# Patient Record
Sex: FEMALE | Race: ASIAN | NOT HISPANIC OR LATINO | Employment: UNEMPLOYED | URBAN - METROPOLITAN AREA
[De-identification: names, ages, dates, MRNs, and addresses within clinical notes are randomized per-mention and may not be internally consistent; named-entity substitution may affect disease eponyms.]

---

## 2018-06-05 ENCOUNTER — OFFICE VISIT (OUTPATIENT)
Dept: GYNECOLOGY | Facility: CLINIC | Age: 53
End: 2018-06-05
Payer: COMMERCIAL

## 2018-06-05 VITALS
HEIGHT: 61 IN | SYSTOLIC BLOOD PRESSURE: 100 MMHG | DIASTOLIC BLOOD PRESSURE: 60 MMHG | BODY MASS INDEX: 30.47 KG/M2 | WEIGHT: 161.4 LBS

## 2018-06-05 DIAGNOSIS — Z12.31 ENCOUNTER FOR SCREENING MAMMOGRAM FOR MALIGNANT NEOPLASM OF BREAST: Primary | ICD-10-CM

## 2018-06-05 DIAGNOSIS — D25.2 INTRAMURAL AND SUBSEROUS LEIOMYOMA OF UTERUS: ICD-10-CM

## 2018-06-05 DIAGNOSIS — Z01.419 ENCOUNTER FOR ROUTINE GYNECOLOGICAL EXAMINATION WITH PAPANICOLAOU SMEAR OF CERVIX: ICD-10-CM

## 2018-06-05 DIAGNOSIS — K59.00 CONSTIPATION, UNSPECIFIED CONSTIPATION TYPE: ICD-10-CM

## 2018-06-05 DIAGNOSIS — D25.1 INTRAMURAL AND SUBSEROUS LEIOMYOMA OF UTERUS: ICD-10-CM

## 2018-06-05 DIAGNOSIS — R10.2 PELVIC PRESSURE IN FEMALE: ICD-10-CM

## 2018-06-05 DIAGNOSIS — R14.0 BLOATING: ICD-10-CM

## 2018-06-05 PROCEDURE — 99212 OFFICE O/P EST SF 10 MIN: CPT | Performed by: OBSTETRICS & GYNECOLOGY

## 2018-06-05 PROCEDURE — G0145 SCR C/V CYTO,THINLAYER,RESCR: HCPCS | Performed by: OBSTETRICS & GYNECOLOGY

## 2018-06-05 RX ORDER — QUINIDINE GLUCONATE 324 MG
TABLET, EXTENDED RELEASE ORAL 3 TIMES WEEKLY
COMMUNITY

## 2018-06-05 RX ORDER — UBIDECARENONE 75 MG
CAPSULE ORAL DAILY
COMMUNITY

## 2018-06-05 NOTE — PROGRESS NOTES
Assessment/Plan:  Discussed the fibroids  Discussed options  Discussed that the this may be contributing to some of the abdominal bloating pelvic pressure and pain  We discussed options including following, uterine artery embolization, although this may be an issue with her history of allergy to MRI dye  Discussed surgical management including hysterectomy  Patient will return to the office for transvaginal scan to re-evaluate the fibroids and to let us know what decisions she has made  I have also recommended consultation with GI for a baseline colonoscopy but also because of her bloating and constipation issues  Diagnoses and all orders for this visit:    Encounter for screening mammogram for malignant neoplasm of breast  -     Mammo screening bilateral w 3d & cad; Future    Encounter for routine gynecological examination with Papanicolaou smear of cervix  -     Liquid-based pap, screening    Intramural and subserous leiomyoma of uterus    Pelvic pressure in female    Bloating    Constipation, unspecified constipation type    Other orders  -     ferrous gluconate ([facility] IRON) 240 (27 FE) MG tablet; Take by mouth  -     Cholecalciferol ([facility] VITAMIN D) 2000 units CAPS; Take by mouth  -     cyanocobalamin (VITAMIN B-12) 100 mcg tablet; Take by mouth        Subjective:      Patient ID: Lam Akbar is a 50 y o  female  Patient is G0 para 0010 who presents for a annual gyn exam   Since October she has been experiencing more persistent abdominal discomfort, suprapubic pressure, constipation, bloating  She has no associated nauseousness, vomiting, diarrhea,, or fever  No dysuria hematuria, urgency, or change in frequency of urination  She has a known fibroid uterus  In January of 2015 she had an MRI review revealed an intramural fibroid posteriorly measuring 6 7 x 6 3 x 4 5 cm  She had an allergy to the IVP dye  Menses still fairly regular   Missed occasional month    HPI    The following portions of the patient's history were reviewed and updated as appropriate:   She  has a past medical history of Fibroid  She There are no active problems to display for this patient  She  has a past surgical history that includes Dilation and curettage of uterus  Her family history includes Breast cancer in her mother; Thyroid cancer in her cousin  She  has no tobacco, alcohol, and drug history on file  Current Outpatient Prescriptions   Medication Sig Dispense Refill    Cholecalciferol ([facility] VITAMIN D) 2000 units CAPS Take by mouth      cyanocobalamin (VITAMIN B-12) 100 mcg tablet Take by mouth      ferrous gluconate ([facility]) 240 (27 FE) MG tablet Take by mouth       No current facility-administered medications for this visit  No current outpatient prescriptions on file prior to visit  No current facility-administered medications on file prior to visit  She is allergic to dye [iodinated diagnostic agents] and sulfa antibiotics       Review of Systems   Constitutional: Negative  Gastrointestinal: Positive for abdominal distention, abdominal pain and constipation  Negative for blood in stool, diarrhea and nausea  Genitourinary: Positive for dyspareunia and pelvic pain  Negative for difficulty urinating, dysuria, frequency, hematuria, menstrual problem, urgency, vaginal bleeding, vaginal discharge and vaginal pain  Objective:      /60   Ht 5' 0 5" (1 537 m)   Wt 73 2 kg (161 lb 6 4 oz)   BMI 31 00 kg/m²          Physical Exam   Constitutional: She appears well-developed and well-nourished  Neck: Normal range of motion  Neck supple  No thyromegaly present  Cardiovascular: Normal rate, regular rhythm and normal heart sounds  Pulmonary/Chest: Effort normal and breath sounds normal  Right breast exhibits no inverted nipple, no mass, no nipple discharge, no skin change and no tenderness   Left breast exhibits no inverted nipple, no mass, no nipple discharge, no skin change and no tenderness  Abdominal: Soft  Bowel sounds are normal  She exhibits distension  There is no tenderness  Hernia confirmed negative in the right inguinal area and confirmed negative in the left inguinal area  Genitourinary: There is no rash or lesion on the right labia  There is no rash or lesion on the left labia  Uterus is enlarged  Right adnexum displays no mass, no tenderness and no fullness  Left adnexum displays no mass, no tenderness and no fullness  No bleeding in the vagina  No vaginal discharge found  Genitourinary Comments: Uterus 14-16 week size   Lymphadenopathy:        Right: No inguinal adenopathy present  Left: No inguinal adenopathy present

## 2018-06-06 ENCOUNTER — ULTRASOUND (OUTPATIENT)
Dept: GYNECOLOGY | Facility: CLINIC | Age: 53
End: 2018-06-06
Payer: COMMERCIAL

## 2018-06-06 ENCOUNTER — OFFICE VISIT (OUTPATIENT)
Dept: GYNECOLOGY | Facility: CLINIC | Age: 53
End: 2018-06-06
Payer: COMMERCIAL

## 2018-06-06 DIAGNOSIS — D25.2 INTRAMURAL AND SUBSEROUS LEIOMYOMA OF UTERUS: Primary | ICD-10-CM

## 2018-06-06 DIAGNOSIS — D25.1 INTRAMURAL AND SUBSEROUS LEIOMYOMA OF UTERUS: Primary | ICD-10-CM

## 2018-06-06 DIAGNOSIS — D25.1 INTRAMURAL LEIOMYOMA OF UTERUS: ICD-10-CM

## 2018-06-06 PROCEDURE — 76830 TRANSVAGINAL US NON-OB: CPT | Performed by: OBSTETRICS & GYNECOLOGY

## 2018-06-06 PROCEDURE — 76856 US EXAM PELVIC COMPLETE: CPT

## 2018-06-06 PROCEDURE — 99212 OFFICE O/P EST SF 10 MIN: CPT | Performed by: OBSTETRICS & GYNECOLOGY

## 2018-06-06 NOTE — PROGRESS NOTES
Reviewed TVS results and rediscussed options for fibroid uterus   Recommended consultation with GI secondary to bloating, C

## 2018-06-07 LAB
LAB AP GYN PRIMARY INTERPRETATION: NORMAL
Lab: NORMAL

## 2018-06-19 ENCOUNTER — HOSPITAL ENCOUNTER (OUTPATIENT)
Dept: MAMMOGRAPHY | Facility: MEDICAL CENTER | Age: 53
Discharge: HOME | End: 2018-06-19
Payer: COMMERCIAL

## 2018-06-19 DIAGNOSIS — Z12.31 ENCOUNTER FOR SCREENING MAMMOGRAM FOR MALIGNANT NEOPLASM OF BREAST: ICD-10-CM

## 2018-06-19 PROCEDURE — 77063 BREAST TOMOSYNTHESIS BI: CPT

## 2018-06-19 PROCEDURE — 77067 SCR MAMMO BI INCL CAD: CPT

## 2018-06-22 ENCOUNTER — HOSPITAL ENCOUNTER (OUTPATIENT)
Dept: MAMMOGRAPHY | Facility: CLINIC | Age: 53
Discharge: HOME | End: 2018-06-22
Payer: COMMERCIAL

## 2018-06-22 ENCOUNTER — HOSPITAL ENCOUNTER (OUTPATIENT)
Dept: ULTRASOUND IMAGING | Facility: CLINIC | Age: 53
Discharge: HOME | End: 2018-06-22
Payer: COMMERCIAL

## 2018-06-22 DIAGNOSIS — R92.8 ABNORMAL MAMMOGRAM: ICD-10-CM

## 2018-06-22 PROCEDURE — G0279 TOMOSYNTHESIS, MAMMO: HCPCS

## 2018-06-22 PROCEDURE — 76642 ULTRASOUND BREAST LIMITED: CPT

## 2018-06-22 PROCEDURE — 77065 DX MAMMO INCL CAD UNI: CPT

## 2018-06-22 NOTE — PROGRESS NOTES
Met with patient and Dr Patricia Yanez regarding recommendation for;      _____ RIGHT ___x___LEFT      __x___Ultrasound guided  ______Stereotactic  Breast biopsy  __x___Verbalized understanding        Blood thinners:  _____yes __x___no    Date stopped: ____n/a______    Biopsy teaching sheet given:  ___x____yes ______no

## 2018-06-28 ENCOUNTER — HOSPITAL ENCOUNTER (OUTPATIENT)
Dept: ULTRASOUND IMAGING | Facility: CLINIC | Age: 53
Discharge: HOME | End: 2018-06-28
Payer: COMMERCIAL

## 2018-06-28 ENCOUNTER — HOSPITAL ENCOUNTER (OUTPATIENT)
Dept: MAMMOGRAPHY | Facility: CLINIC | Age: 53
Discharge: HOME | End: 2018-06-28

## 2018-06-28 VITALS — SYSTOLIC BLOOD PRESSURE: 140 MMHG | HEART RATE: 80 BPM | DIASTOLIC BLOOD PRESSURE: 88 MMHG

## 2018-06-28 DIAGNOSIS — R92.8 ABNORMAL ULTRASOUND OF BREAST: ICD-10-CM

## 2018-06-28 DIAGNOSIS — R92.8 ABNORMAL MAMMOGRAM: ICD-10-CM

## 2018-06-28 PROCEDURE — 88305 TISSUE EXAM BY PATHOLOGIST: CPT | Performed by: PATHOLOGY

## 2018-06-28 PROCEDURE — 19083 BX BREAST 1ST LESION US IMAG: CPT

## 2018-06-28 PROCEDURE — 88342 IMHCHEM/IMCYTCHM 1ST ANTB: CPT | Performed by: PATHOLOGY

## 2018-06-28 RX ORDER — LIDOCAINE HYDROCHLORIDE 10 MG/ML
5 INJECTION, SOLUTION INFILTRATION; PERINEURAL ONCE
Status: COMPLETED | OUTPATIENT
Start: 2018-06-28 | End: 2018-06-28

## 2018-06-28 RX ADMIN — LIDOCAINE HYDROCHLORIDE 5 ML: 10 INJECTION, SOLUTION INFILTRATION; PERINEURAL at 08:41

## 2018-06-28 NOTE — DISCHARGE INSTR - OTHER ORDERS
POST LARGE CORE BREAST BIOPSY PATIENT INFORMATION      1  Place an ice pack inside your bra over the top of the dressing every hour for 20 minutes (20 minutes on, 60 minutes off)  Do this until bedtime  2  Do not shower or bathe until the following morning  3  You may bathe your breast carefully with the steri-strips in place  Be careful    Not to loosen them  The steri-strips will fall off in 3-5 days  4  You may have mild discomfort, and you may have some bruising where the   Needle entered the skin  This should clear within 5-7 days  5  If you need medicine for discomfort, take acetaminophen products such as   Tylenol  You may also take Advil or Motrin products  6  Do not participate in strenuous activities such as-tennis, aerobics, skiing,  Weight lifting, etc  for 24 hours  Refrain from swimming/soaking for 72 hours  7  Wearing a bra for sleeping may be more comfortable for the first 24-48 hours  8  Watch for continued bleeding, pain or fever over 101; please call with any questions or concerns  For procedures [facility] at [facility] call:  Sydney Whaley RN at [phone]  Latoya Keating RN at [phone]                    *After 4 PM call the Interventional Radiology Department                    [phone] and ask to speak with the nurse on call  For procedures done at the [address] [place] [address] call:         Duncan Saleh RN at   *After 4 PM call the Interventional Radiology Department   [phone] and ask to speak with the nurse on call  For procedures done at [address] [place] [address] call: The Radiology Nurse at [phone]  *After 4 PM call your physician, or go to the Emergency Department  9          The final results of your biopsy are usually available within one week

## 2018-06-28 NOTE — PROGRESS NOTES
Procedure type:    __x___ultrasound guided _____stereotactic    Breast:    ___x__Left _____Right    Location: 5:00 3cm from nipple    Needle: 12g Courtney    # of passes: 5    Clip: Hydromark-butterfly    Performed by: Dr Claudia Masters held for 5 minutes by: Paolo Barron    Stermolly Strips:    __x___yes _____no    Band aid:    __x___yes_____no    Tape and guaze:    _____yes __x___no    Tolerated procedure:    __x___yes _____no

## 2018-06-29 NOTE — PROGRESS NOTES
Spoke with pt 6/29/18 at 10:39    Post procedure call completed    Bleeding: _____yes __x___no    Pain: _____yes ___x___no    Redness/Swelling: ______yes ___x___no    Band aid removed: _____yes __x___no; pt will remove after bath today    Steri-Strips intact: ___x___yes _____no

## 2018-07-03 ENCOUNTER — TELEPHONE (OUTPATIENT)
Dept: MAMMOGRAPHY | Facility: CLINIC | Age: 53
End: 2018-07-03

## 2018-07-03 ENCOUNTER — OFFICE VISIT (OUTPATIENT)
Dept: GYNECOLOGY | Facility: CLINIC | Age: 53
End: 2018-07-03
Payer: COMMERCIAL

## 2018-07-03 VITALS
HEIGHT: 61 IN | SYSTOLIC BLOOD PRESSURE: 118 MMHG | BODY MASS INDEX: 30.89 KG/M2 | WEIGHT: 163.6 LBS | DIASTOLIC BLOOD PRESSURE: 78 MMHG

## 2018-07-03 DIAGNOSIS — D25.2 INTRAMURAL AND SUBSEROUS LEIOMYOMA OF UTERUS: ICD-10-CM

## 2018-07-03 DIAGNOSIS — R10.2 PELVIC PAIN: Primary | ICD-10-CM

## 2018-07-03 DIAGNOSIS — D25.1 INTRAMURAL AND SUBSEROUS LEIOMYOMA OF UTERUS: ICD-10-CM

## 2018-07-03 PROCEDURE — 99215 OFFICE O/P EST HI 40 MIN: CPT | Performed by: OBSTETRICS & GYNECOLOGY

## 2018-07-03 RX ORDER — NAPROXEN SODIUM 550 MG/1
550 TABLET ORAL 2 TIMES DAILY WITH MEALS
Qty: 30 TABLET | Refills: 1 | Status: SHIPPED | OUTPATIENT
Start: 2018-07-03 | End: 2018-09-17

## 2018-07-03 NOTE — PROGRESS NOTES
Assessment/Plan:    Discussed options  Discussed that the this may be contributing to some of the abdominal bloating pelvic pressure and pain  We discussed options including following, uterine artery embolization, although this may be an issue with her history of allergy to MRI dye  Discussed surgical management including hysterectomy  All patient is opted for a hysterectomy  The plan will be a total laparoscopic hysterectomy with bilateral salpingectomy  Risks of the surgery were discussed including but not limited to infection, hemorrhage, bowel, bladder, vascular, ureteral injury, possible necessity for laparotomy  Diagnoses and all orders for this visit:    Intramural and subserous leiomyoma of uterus    Pelvic pain        Subjective:      Patient ID: Og Yarbrough is a 48 y o  female  HPI     See note from June 6  Patient has a known fibroid uterus  Last ultrasound showed an 8 centimeter posterior fibroid  Since that visit the patient complaining of increasing pelvic pressure fatigue lower lobe extremity swelling pelvic pressure  This discomfort is dull initially intermittent but is now becoming worse  The following portions of the patient's history were reviewed and updated as appropriate:   She  has a past medical history of Fibroid  She There are no active problems to display for this patient  She  has a past surgical history that includes Dilation and curettage of uterus  Her family history includes Breast cancer in her mother; Thyroid cancer in her cousin  She  has no tobacco, alcohol, and drug history on file  Current Outpatient Prescriptions   Medication Sig Dispense Refill    Cholecalciferol ([facility] VITAMIN D) 2000 units CAPS Take by mouth      cyanocobalamin (VITAMIN B-12) 100 mcg tablet Take by mouth      ferrous gluconate ([facility] IRON) 240 (27 FE) MG tablet Take by mouth       No current facility-administered medications for this visit        Current Outpatient Prescriptions on File Prior to Visit   Medication Sig    Cholecalciferol ([facility] VITAMIN D) 2000 units CAPS Take by mouth    cyanocobalamin (VITAMIN B-12) 100 mcg tablet Take by mouth    ferrous gluconate ([facility] IRON) 240 (27 FE) MG tablet Take by mouth     No current facility-administered medications on file prior to visit  She is allergic to dye [iodinated diagnostic agents]; peanut oil; and sulfa antibiotics       Review of Systems   Constitutional: Positive for fatigue  Negative for fever  Gastrointestinal: Positive for abdominal distention, abdominal pain and constipation  Negative for blood in stool, diarrhea and nausea  Genitourinary: Positive for frequency and pelvic pain  Negative for difficulty urinating, hematuria, urgency, vaginal discharge and vaginal pain  Objective:      /78   Ht 5' 0 5" (1 537 m)   Wt 74 2 kg (163 lb 9 6 oz)   BMI 31 43 kg/m²          Physical Exam   Constitutional: She appears well-developed and well-nourished  Neck: Normal range of motion  Neck supple  No thyromegaly present  Cardiovascular: Normal rate, regular rhythm and normal heart sounds  Pulmonary/Chest: Effort normal and breath sounds normal    Abdominal: Soft  Bowel sounds are normal  She exhibits distension and mass  There is tenderness  There is no rebound and no guarding  Hernia confirmed negative in the right inguinal area and confirmed negative in the left inguinal area  Genitourinary: There is no rash or lesion on the right labia  There is no rash or lesion on the left labia  Uterus is enlarged  Uterus is not deviated, not fixed and not tender  Cervix exhibits no motion tenderness, no discharge and no friability  Right adnexum displays no mass, no tenderness and no fullness  Left adnexum displays no mass, no tenderness and no fullness  No bleeding in the vagina  No vaginal discharge found  Lymphadenopathy:        Right: No inguinal adenopathy present  Left: No inguinal adenopathy present

## 2018-09-06 PROBLEM — D25.9 UTERINE LEIOMYOMA: Status: ACTIVE | Noted: 2018-09-06

## 2018-09-16 ENCOUNTER — ANESTHESIA EVENT (OUTPATIENT)
Dept: PERIOP | Facility: HOSPITAL | Age: 53
End: 2018-09-16
Payer: COMMERCIAL

## 2018-09-16 RX ORDER — SODIUM CHLORIDE 9 MG/ML
125 INJECTION, SOLUTION INTRAVENOUS CONTINUOUS
Status: CANCELLED | OUTPATIENT
Start: 2018-09-24

## 2018-09-17 ENCOUNTER — APPOINTMENT (OUTPATIENT)
Dept: LAB | Facility: HOSPITAL | Age: 53
End: 2018-09-17
Attending: OBSTETRICS & GYNECOLOGY
Payer: COMMERCIAL

## 2018-09-17 ENCOUNTER — APPOINTMENT (OUTPATIENT)
Dept: PREADMISSION TESTING | Facility: HOSPITAL | Age: 53
End: 2018-09-17
Payer: COMMERCIAL

## 2018-09-17 DIAGNOSIS — D25.9 UTERINE LEIOMYOMA, UNSPECIFIED LOCATION: ICD-10-CM

## 2018-09-17 LAB
ABO GROUP BLD: NORMAL
BLD GP AB SCN SERPL QL: NEGATIVE
ERYTHROCYTE [DISTWIDTH] IN BLOOD BY AUTOMATED COUNT: 12.9 % (ref 11.6–15.1)
EST. AVERAGE GLUCOSE BLD GHB EST-MCNC: 120 MG/DL
HBA1C MFR BLD: 5.8 % (ref 4.2–6.3)
HCT VFR BLD AUTO: 40.1 % (ref 34.8–46.1)
HGB BLD-MCNC: 13.1 G/DL (ref 11.5–15.4)
MCH RBC QN AUTO: 29.8 PG (ref 26.8–34.3)
MCHC RBC AUTO-ENTMCNC: 32.7 G/DL (ref 31.4–37.4)
MCV RBC AUTO: 91 FL (ref 82–98)
PLATELET # BLD AUTO: 273 THOUSANDS/UL (ref 149–390)
PMV BLD AUTO: 9.5 FL (ref 8.9–12.7)
RBC # BLD AUTO: 4.39 MILLION/UL (ref 3.81–5.12)
RH BLD: NEGATIVE
SPECIMEN EXPIRATION DATE: NORMAL
WBC # BLD AUTO: 6.66 THOUSAND/UL (ref 4.31–10.16)

## 2018-09-17 PROCEDURE — 86900 BLOOD TYPING SEROLOGIC ABO: CPT

## 2018-09-17 PROCEDURE — 86850 RBC ANTIBODY SCREEN: CPT

## 2018-09-17 PROCEDURE — 93005 ELECTROCARDIOGRAM TRACING: CPT

## 2018-09-17 PROCEDURE — 36415 COLL VENOUS BLD VENIPUNCTURE: CPT

## 2018-09-17 PROCEDURE — 86901 BLOOD TYPING SEROLOGIC RH(D): CPT

## 2018-09-17 PROCEDURE — 83036 HEMOGLOBIN GLYCOSYLATED A1C: CPT

## 2018-09-17 PROCEDURE — 85027 COMPLETE CBC AUTOMATED: CPT

## 2018-09-17 RX ORDER — IBUPROFEN 200 MG
200-800 TABLET ORAL EVERY 6 HOURS PRN
COMMUNITY
End: 2019-04-02

## 2018-09-17 NOTE — PRE-PROCEDURE INSTRUCTIONS
Pre-Surgery Instructions:   Medication Instructions    Cholecalciferol ([facility] VITAMIN D) 2000 units CAPS Instructed patient per Anesthesia Guidelines   cyanocobalamin (VITAMIN B-12) 100 mcg tablet Instructed patient per Anesthesia Guidelines   ferrous gluconate ([facility]ON) 240 (27 FE) MG tablet Instructed patient per Anesthesia Guidelines   ibuprofen (MOTRIN) 200 mg tablet Patient was instructed by Physician and understands  No meds needed am of surgery  per anesthesia DR Emma Jeffers

## 2018-09-17 NOTE — ANESTHESIA PREPROCEDURE EVALUATION
Review of Systems/Medical History  Patient summary reviewed  Chart reviewed      Cardiovascular  Negative cardio ROS    Pulmonary  Negative pulmonary ROS        GI/Hepatic  Negative GI/hepatic ROS          Negative  ROS        Endo/Other  Negative endo/other ROS      GYN  Negative gynecology ROS          Hematology  Anemia iron deficiency anemia,     Musculoskeletal  Back pain (HCD) , cervical pain,        Neurology  Negative neurology ROS      Psychology   Negative psychology ROS              Physical Exam    Airway    Mallampati score: II  TM Distance: >3 FB  Neck ROM: full     Dental   No notable dental hx     Cardiovascular  Comment: Negative ROS, Rhythm: regular, Rate: normal, Cardiovascular exam normal    Pulmonary  Pulmonary exam normal Breath sounds clear to auscultation,     Other Findings        Anesthesia Plan  ASA Score- 2     Anesthesia Type- general with ASA Monitors  Additional Monitors:   Airway Plan: ETT  Plan Factors-Patient not instructed to abstain from smoking on day of procedure  Patient did not smoke on day of surgery  Induction- intravenous  Postoperative Plan- Plan for postoperative opioid use  Informed Consent- Anesthetic plan and risks discussed with patient

## 2018-09-18 LAB
ATRIAL RATE: 50 BPM
P AXIS: 33 DEGREES
PR INTERVAL: 148 MS
QRS AXIS: 31 DEGREES
QRSD INTERVAL: 76 MS
QT INTERVAL: 412 MS
QTC INTERVAL: 375 MS
T WAVE AXIS: 15 DEGREES
VENTRICULAR RATE: 50 BPM

## 2018-09-18 PROCEDURE — 93010 ELECTROCARDIOGRAM REPORT: CPT | Performed by: INTERNAL MEDICINE

## 2018-09-24 ENCOUNTER — HOSPITAL ENCOUNTER (OUTPATIENT)
Facility: HOSPITAL | Age: 53
Setting detail: OUTPATIENT SURGERY
Discharge: HOME | End: 2018-09-24
Attending: OBSTETRICS & GYNECOLOGY | Admitting: OBSTETRICS & GYNECOLOGY
Payer: COMMERCIAL

## 2018-09-24 ENCOUNTER — ANESTHESIA (OUTPATIENT)
Dept: PERIOP | Facility: HOSPITAL | Age: 53
End: 2018-09-24
Payer: COMMERCIAL

## 2018-09-24 VITALS
WEIGHT: 165.6 LBS | TEMPERATURE: 97.6 F | RESPIRATION RATE: 18 BRPM | OXYGEN SATURATION: 97 % | HEIGHT: 62 IN | BODY MASS INDEX: 30.47 KG/M2 | HEART RATE: 71 BPM | SYSTOLIC BLOOD PRESSURE: 158 MMHG | DIASTOLIC BLOOD PRESSURE: 77 MMHG

## 2018-09-24 DIAGNOSIS — D25.9 UTERINE LEIOMYOMA, UNSPECIFIED LOCATION: ICD-10-CM

## 2018-09-24 DIAGNOSIS — Z90.710 STATUS POST TOTAL HYSTERECTOMY: Primary | ICD-10-CM

## 2018-09-24 LAB
EXT PREGNANCY TEST URINE: NEGATIVE
GLUCOSE SERPL-MCNC: 89 MG/DL (ref 65–140)

## 2018-09-24 PROCEDURE — 82948 REAGENT STRIP/BLOOD GLUCOSE: CPT

## 2018-09-24 PROCEDURE — 88307 TISSUE EXAM BY PATHOLOGIST: CPT | Performed by: PATHOLOGY

## 2018-09-24 PROCEDURE — 81025 URINE PREGNANCY TEST: CPT | Performed by: ANESTHESIOLOGY

## 2018-09-24 PROCEDURE — 58573 TLH W/T/O UTERUS OVER 250 G: CPT | Performed by: OBSTETRICS & GYNECOLOGY

## 2018-09-24 RX ORDER — HYDROMORPHONE HYDROCHLORIDE 2 MG/ML
INJECTION, SOLUTION INTRAMUSCULAR; INTRAVENOUS; SUBCUTANEOUS AS NEEDED
Status: DISCONTINUED | OUTPATIENT
Start: 2018-09-24 | End: 2018-09-24 | Stop reason: SURG

## 2018-09-24 RX ORDER — DEXAMETHASONE SODIUM PHOSPHATE 4 MG/ML
INJECTION, SOLUTION INTRA-ARTICULAR; INTRALESIONAL; INTRAMUSCULAR; INTRAVENOUS; SOFT TISSUE AS NEEDED
Status: DISCONTINUED | OUTPATIENT
Start: 2018-09-24 | End: 2018-09-24 | Stop reason: SURG

## 2018-09-24 RX ORDER — ONDANSETRON 2 MG/ML
4 INJECTION INTRAMUSCULAR; INTRAVENOUS ONCE AS NEEDED
Status: DISCONTINUED | OUTPATIENT
Start: 2018-09-24 | End: 2018-09-24 | Stop reason: HOSPADM

## 2018-09-24 RX ORDER — MIDAZOLAM HYDROCHLORIDE 1 MG/ML
INJECTION INTRAMUSCULAR; INTRAVENOUS AS NEEDED
Status: DISCONTINUED | OUTPATIENT
Start: 2018-09-24 | End: 2018-09-24 | Stop reason: SURG

## 2018-09-24 RX ORDER — GLYCOPYRROLATE 0.2 MG/ML
INJECTION INTRAMUSCULAR; INTRAVENOUS AS NEEDED
Status: DISCONTINUED | OUTPATIENT
Start: 2018-09-24 | End: 2018-09-24 | Stop reason: SURG

## 2018-09-24 RX ORDER — KETOROLAC TROMETHAMINE 30 MG/ML
INJECTION, SOLUTION INTRAMUSCULAR; INTRAVENOUS AS NEEDED
Status: DISCONTINUED | OUTPATIENT
Start: 2018-09-24 | End: 2018-09-24 | Stop reason: SURG

## 2018-09-24 RX ORDER — FENTANYL CITRATE/PF 50 MCG/ML
25 SYRINGE (ML) INJECTION
Status: DISCONTINUED | OUTPATIENT
Start: 2018-09-24 | End: 2018-09-24 | Stop reason: HOSPADM

## 2018-09-24 RX ORDER — OXYCODONE HYDROCHLORIDE AND ACETAMINOPHEN 5; 325 MG/1; MG/1
2 TABLET ORAL EVERY 4 HOURS PRN
Status: DISCONTINUED | OUTPATIENT
Start: 2018-09-24 | End: 2018-09-24 | Stop reason: HOSPADM

## 2018-09-24 RX ORDER — OXYCODONE HYDROCHLORIDE AND ACETAMINOPHEN 5; 325 MG/1; MG/1
1 TABLET ORAL EVERY 4 HOURS PRN
Status: DISCONTINUED | OUTPATIENT
Start: 2018-09-24 | End: 2018-09-24 | Stop reason: HOSPADM

## 2018-09-24 RX ORDER — ONDANSETRON 2 MG/ML
INJECTION INTRAMUSCULAR; INTRAVENOUS AS NEEDED
Status: DISCONTINUED | OUTPATIENT
Start: 2018-09-24 | End: 2018-09-24 | Stop reason: SURG

## 2018-09-24 RX ORDER — HYDROCODONE BITARTRATE AND ACETAMINOPHEN 5; 325 MG/1; MG/1
1 TABLET ORAL EVERY 6 HOURS PRN
Qty: 20 TABLET | Refills: 0 | Status: SHIPPED | OUTPATIENT
Start: 2018-09-24 | End: 2018-10-04

## 2018-09-24 RX ORDER — FENTANYL CITRATE 50 UG/ML
INJECTION, SOLUTION INTRAMUSCULAR; INTRAVENOUS AS NEEDED
Status: DISCONTINUED | OUTPATIENT
Start: 2018-09-24 | End: 2018-09-24 | Stop reason: SURG

## 2018-09-24 RX ORDER — EPHEDRINE SULFATE 50 MG/ML
INJECTION, SOLUTION INTRAVENOUS AS NEEDED
Status: DISCONTINUED | OUTPATIENT
Start: 2018-09-24 | End: 2018-09-24 | Stop reason: SURG

## 2018-09-24 RX ORDER — SODIUM CHLORIDE 9 MG/ML
125 INJECTION, SOLUTION INTRAVENOUS CONTINUOUS
Status: CANCELLED | OUTPATIENT
Start: 2018-09-24

## 2018-09-24 RX ORDER — ROCURONIUM BROMIDE 10 MG/ML
INJECTION, SOLUTION INTRAVENOUS AS NEEDED
Status: DISCONTINUED | OUTPATIENT
Start: 2018-09-24 | End: 2018-09-24 | Stop reason: SURG

## 2018-09-24 RX ORDER — SODIUM CHLORIDE 9 MG/ML
125 INJECTION, SOLUTION INTRAVENOUS CONTINUOUS
Status: DISCONTINUED | OUTPATIENT
Start: 2018-09-24 | End: 2018-09-24 | Stop reason: HOSPADM

## 2018-09-24 RX ORDER — MAGNESIUM HYDROXIDE 1200 MG/15ML
LIQUID ORAL AS NEEDED
Status: DISCONTINUED | OUTPATIENT
Start: 2018-09-24 | End: 2018-09-24 | Stop reason: HOSPADM

## 2018-09-24 RX ORDER — PROPOFOL 10 MG/ML
INJECTION, EMULSION INTRAVENOUS AS NEEDED
Status: DISCONTINUED | OUTPATIENT
Start: 2018-09-24 | End: 2018-09-24 | Stop reason: SURG

## 2018-09-24 RX ORDER — IBUPROFEN 600 MG/1
600 TABLET ORAL EVERY 6 HOURS PRN
Status: DISCONTINUED | OUTPATIENT
Start: 2018-09-24 | End: 2018-09-24 | Stop reason: HOSPADM

## 2018-09-24 RX ADMIN — ROCURONIUM BROMIDE 50 MG: 10 INJECTION INTRAVENOUS at 11:15

## 2018-09-24 RX ADMIN — NEOSTIGMINE METHYLSULFATE 3 MG: 1 INJECTION, SOLUTION INTRAMUSCULAR; INTRAVENOUS; SUBCUTANEOUS at 13:18

## 2018-09-24 RX ADMIN — HYDROMORPHONE HYDROCHLORIDE 0.5 MG: 2 INJECTION, SOLUTION INTRAMUSCULAR; INTRAVENOUS; SUBCUTANEOUS at 12:05

## 2018-09-24 RX ADMIN — FENTANYL CITRATE 25 MCG: 50 INJECTION INTRAMUSCULAR; INTRAVENOUS at 14:23

## 2018-09-24 RX ADMIN — IBUPROFEN 600 MG: 600 TABLET, FILM COATED ORAL at 15:28

## 2018-09-24 RX ADMIN — CEFAZOLIN SODIUM 2000 MG: 1 SOLUTION INTRAVENOUS at 11:19

## 2018-09-24 RX ADMIN — OXYCODONE AND ACETAMINOPHEN 1 TABLET: 5; 325 TABLET ORAL at 17:47

## 2018-09-24 RX ADMIN — GLYCOPYRROLATE 0.6 MG: 0.2 INJECTION, SOLUTION INTRAMUSCULAR; INTRAVENOUS at 13:18

## 2018-09-24 RX ADMIN — SODIUM CHLORIDE: 0.9 INJECTION, SOLUTION INTRAVENOUS at 12:33

## 2018-09-24 RX ADMIN — SODIUM CHLORIDE: 0.9 INJECTION, SOLUTION INTRAVENOUS at 11:30

## 2018-09-24 RX ADMIN — SODIUM CHLORIDE 125 ML/HR: 0.9 INJECTION, SOLUTION INTRAVENOUS at 14:39

## 2018-09-24 RX ADMIN — LIDOCAINE HYDROCHLORIDE 80 MG: 20 INJECTION, SOLUTION INTRAVENOUS at 11:15

## 2018-09-24 RX ADMIN — MIDAZOLAM 2 MG: 1 INJECTION INTRAMUSCULAR; INTRAVENOUS at 11:02

## 2018-09-24 RX ADMIN — FENTANYL CITRATE 100 MCG: 50 INJECTION, SOLUTION INTRAMUSCULAR; INTRAVENOUS at 11:15

## 2018-09-24 RX ADMIN — HYDROMORPHONE HYDROCHLORIDE 0.5 MG: 2 INJECTION, SOLUTION INTRAMUSCULAR; INTRAVENOUS; SUBCUTANEOUS at 12:39

## 2018-09-24 RX ADMIN — FENTANYL CITRATE 25 MCG: 50 INJECTION INTRAMUSCULAR; INTRAVENOUS at 14:17

## 2018-09-24 RX ADMIN — KETOROLAC TROMETHAMINE 30 MG: 30 INJECTION, SOLUTION INTRAMUSCULAR at 13:12

## 2018-09-24 RX ADMIN — ONDANSETRON HYDROCHLORIDE 4 MG: 2 INJECTION, SOLUTION INTRAVENOUS at 11:02

## 2018-09-24 RX ADMIN — SODIUM CHLORIDE 125 ML/HR: 0.9 INJECTION, SOLUTION INTRAVENOUS at 10:23

## 2018-09-24 RX ADMIN — DEXAMETHASONE SODIUM PHOSPHATE 4 MG: 4 INJECTION, SOLUTION INTRAMUSCULAR; INTRAVENOUS at 11:44

## 2018-09-24 RX ADMIN — EPHEDRINE SULFATE 10 MG: 50 INJECTION, SOLUTION INTRAMUSCULAR; INTRAVENOUS; SUBCUTANEOUS at 11:58

## 2018-09-24 RX ADMIN — PROPOFOL 170 MG: 10 INJECTION, EMULSION INTRAVENOUS at 11:15

## 2018-09-24 NOTE — ANESTHESIA POSTPROCEDURE EVALUATION
Post-Op Assessment Note      CV Status:  Stable    Mental Status:  Alert and awake    Hydration Status:  Euvolemic    PONV Controlled:  Controlled    Airway Patency:  Patent  Airway: intubated    Post Op Vitals Reviewed: Yes          Staff: Anesthesiologist           /63 (09/24/18 1345)    Temp (!) 97 4 °F (36 3 °C) (09/24/18 1345)    Pulse 74 (09/24/18 1345)   Resp 12 (09/24/18 1345)    SpO2 100 % (09/24/18 1345)

## 2018-09-24 NOTE — H&P
Assessment/Plan:     Discussed options   Discussed that the this may be contributing to some of the abdominal bloating pelvic pressure and pain  We discussed options including following, uterine artery embolization, although this may be an issue with her history of allergy to MRI dye   Discussed surgical management including hysterectomy  All patient is opted for a hysterectomy  The plan will be a total laparoscopic hysterectomy with bilateral salpingectomy  Risks of the surgery were discussed including but not limited to infection, hemorrhage, bowel, bladder, vascular, ureteral injury, possible necessity for laparotomy  Diagnoses and all orders for this visit:     Intramural and subserous leiomyoma of uterus     Pelvic pain         Subjective:       Patient ID: Lisa James is a 48 y o  female      HPI      See note from June 6  Patient has a known fibroid uterus  Last ultrasound showed an 8 centimeter posterior fibroid  Since that visit the patient complaining of increasing pelvic pressure fatigue lower lobe extremity swelling pelvic pressure  This discomfort is dull initially intermittent but is now becoming worse         The following portions of the patient's history were reviewed and updated as appropriate:   She  has a past medical history of Fibroid  She There are no active problems to display for this patient      She  has a past surgical history that includes Dilation and curettage of uterus  Her family history includes Breast cancer in her mother; Thyroid cancer in her cousin  She  has no tobacco, alcohol, and drug history on file           Current Outpatient Prescriptions   Medication Sig Dispense Refill    Cholecalciferol ([facility] VITAMIN D) 2000 units CAPS Take by mouth        cyanocobalamin (VITAMIN B-12) 100 mcg tablet Take by mouth        ferrous gluconate ([facility]) 240 (27 FE) MG tablet Take by mouth          No current facility-administered medications for this visit       Current Outpatient Prescriptions on File Prior to Visit   Medication Sig    Cholecalciferol ([facility] VITAMIN D) 2000 units CAPS Take by mouth    cyanocobalamin (VITAMIN B-12) 100 mcg tablet Take by mouth    ferrous gluconate ([facility] IRON) 240 (27 FE) MG tablet Take by mouth      No current facility-administered medications on file prior to visit        She is allergic to dye [iodinated diagnostic agents]; peanut oil; and sulfa antibiotics        Review of Systems   Constitutional: Positive for fatigue  Negative for fever  Gastrointestinal: Positive for abdominal distention, abdominal pain and constipation  Negative for blood in stool, diarrhea and nausea  Genitourinary: Positive for frequency and pelvic pain  Negative for difficulty urinating, hematuria, urgency, vaginal discharge and vaginal pain           Objective:        /78   Ht 5' 0 5" (1 537 m)   Wt 74 2 kg (163 lb 9 6 oz)   BMI 31 43 kg/m²             Physical Exam   Constitutional: She appears well-developed and well-nourished  Neck: Normal range of motion  Neck supple  No thyromegaly present  Cardiovascular: Normal rate, regular rhythm and normal heart sounds  Pulmonary/Chest: Effort normal and breath sounds normal    Abdominal: Soft  Bowel sounds are normal  She exhibits distension and mass  There is tenderness  There is no rebound and no guarding  Hernia confirmed negative in the right inguinal area and confirmed negative in the left inguinal area  Genitourinary: There is no rash or lesion on the right labia  There is no rash or lesion on the left labia  Uterus is enlarged  Uterus is not deviated, not fixed and not tender  Cervix exhibits no motion tenderness, no discharge and no friability  Right adnexum displays no mass, no tenderness and no fullness  Left adnexum displays no mass, no tenderness and no fullness  No bleeding in the vagina  No vaginal discharge found  Lymphadenopathy:        Right: No inguinal adenopathy present  Left: No inguinal adenopathy present

## 2018-09-24 NOTE — OP NOTE
OPERATIVE REPORT  PATIENT NAME: Brad Carter    :  1965  MRN: [id number]  Pt Location: [facility] OR ROOM 01    SURGERY DATE: 2018    Surgeon(s) and Role:     * Blayne Alonzo MD - Assisting     * Gretchen Pennington MD - Assisting     * Lb Fontaine DO - Primary    Preop Diagnosis:  Uterine leiomyoma, unspecified location [D25 9]    Post-Op Diagnosis Codes:     * Uterine leiomyoma, unspecified location [D25 9]    Procedure(s) (LRB):  HYSTERECTOMY LAPAROSCOPIC TOTAL ([address]); BILATERAL SALPINGECTOMY (N/A)    Specimen(s):  ID Type Source Tests Collected by Time Destination   1 : UTERUS, CERVIX, BL FALLOPIAN TUBES Tissue Uterus TISSUE Virgen Charlette, DO 2018 1138        Estimated Blood Loss:   25 mL    Drains:  [REMOVED] NG/OG/Enteral Tube Orogastric 18 Fr Center mouth (Removed)   Number of days: 0       [REMOVED] Urethral Catheter Non-latex; Double-lumen 16 Fr  (Removed)   Number of days: 0       Anesthesia Type:   General    Operative Indications:  Uterine leiomyoma, unspecified location [D25 9]    Operative Findings:  Globular uterus with 8cm posterior fundal fibroid  Cystoscopy: efflux noted from bilateral ureteral orifices  No mesh, suture material, or injury noted to bladder lumen      Complications:   None    Procedure and Technique:  OPERATIVE REPORT  PATIENT NAME: Hammad Fuentes    :  1960  MRN: [id number]  Pt Location: [facility] 01     SURGERY DATE: 2018     Surgeon(s) and Role:     Karine Gabriel, DO - Primary     * Blayne Alonzo MD - Assisting     * Gretchen Pennington MD - Assisting     Preop Diagnosis:  Uterine leiomyoma, unspecified location [D25 9]     Post-Op Diagnosis Codes:     * Uterine leiomyoma, unspecified location [D25 9]     Procedure(s) (LRB):  HYSTERECTOMY LAPAROSCOPIC TOTAL ([address]); BILATERAL SALPINGECTOMY (N/A)  CYSTOSCOPY (N/A)     Specimen(s):  ID Type Source Tests Collected by Time Destination   1 : UTERUS, CERVIX and BL TUBES Tissue Uterus TISSUE Wanda Rojas DO [place] 9/24/2018 0427        IVF: 2500ml crystalloid     UOP: 400ml     Estimated Blood Loss:   75 mL     Drains:  NG/OG/Enteral Tube Orogastric 18 Fr Center mouth (Active)   Number of days: 0       Urethral Catheter Non-latex 16 Fr  (Active)   Number of days: 0         Anesthesia Type:   General     Operative Indications:  Uterine leiomyoma, unspecified location [D25 9]     Operative Findings:  Fibroid uterus, central posterior 8cm intramural fibroid, intraoperative weight 440gm  Normal-appearing uterus, tubes and ovaries bilaterally  Cystoscopy: efflux noted from bilateral ureteral orifices  No mesh, suture material, or injury noted to bladder lumen      Complications:   None     Procedure and Technique:  Appropriate preoperative antibiotics chosen per ACOG guidelines were given  Bilateral SCDs were placed in the lower extremities for DVT prevention prior to the institution of anesthesia      No bladder, ureteral, viscus, or solid organ injury were noted at the end of the procedure      The patient was identified in the holding area by the operating room staff and attending physician  She was taken to the operating room where anesthesia was instituted without complications  She was placed in the dorsal lithotomy position with the legs in [id number] [place] [address] with care taken to avoid excessive flexion or extension of her lower extremities  The patient was prepped and draped in the usual sterile fashion   A Frances catheter was inserted      Next, the deepest part of the umbilicus was grasped and everted with an Allis clamp   The edges of the umbilicus were grasped with 2 towel clamps to elevate the abdominal wall away from the abdominal organs and vessels   A Veress needle was gently inserted into the umbilicus at a 45 degree angle   Once entry into the abdominal cavity was confirmed, the abdomen was insufflated with CO2 gas to 15 mmHg   Next, a 10 mm diagnostic laparoscope was inserted via direct entry into the umbilicus   The patient was then placed in Trendelenburg for better visualization of the pelvis   Next, a 10 mm trocar was inserted into the left lateral quadrant under direct visualization  Then a 15 mm trocar was inserted into the right lateral quadrant under direct visualization       A 5mm suprapubic port was placed under direct visualization for additional retraction   The bowel was swept out of the pelvis without difficulty  The uterus was tilted to the left side and the right round ligament was divided with the LigaSure device   The bladder flap was developed to the midline and completed with a combination of blunt and sharp dissection with the monopolar sunni  Next, the right tube was grasped at the fimbriae and the mesosalpinx below transected with the Ligasure device  The tube was amputated at the cornua and removed  Next, the utero-ovarian ligament was coagulated and transected with the Ligasure device  Next, the posterior leaf of the broad ligament was further divided with the monopolar sunni toward the colpotomy cup to further lateralize the ureter   The uterine artery was skeletonized with a combination of blunt and sharp dissection  The right uterine artery was coagulated with the LigaSure device      Next attention was turned to the left side   The round ligament again was divided with the LigaSure device   The left tube was grasped at the fimbria and the mesosalpinx transected with the Ligasure device  The left tube was amputated at the cornua and removed  Next, the bladder flap was completed anteriorly with the Ligasure device and monopolar hook  The ureter was identified on the medial leaf of the broad ligament and noted to be vermiculating normally and remote from the operative field  Next, the utero-ovarian ligament was coagulated and transected with the Ligasure device  The posterior peritoneum was divided above the ureter toward the colpotomy cup on the left side   The uterine artery was carefully skeletonized   It was then coagulated and transected with the LigaSure device   The pedicle was hemostatic   Next, a Arnett bite was made from the contralateral side to further divide the cardinal ligament   The pedicle again continued to be hemostatic      Attention was turned back to the right side   At this time the uterus was noted to be blanching, consistent with successful ligation of all of the blood supply   The right uterine artery was then transected   Another Heriberto Tin bite was made from the contralateral side to lateralize the cardinal ligament   At this time the colpotomy cup was fully exposed 360° and decision was made to proceed with the colpotomy      The vagina was entered anteriorly with the monopolar hook   Taking care to avoid bowel and pelvic sidewall, the hook was applied circumferentially to the rim of the colpotomy cup used to complete the colpotomy without difficulty       The cuff was examined and noted to be relatively hemostatic   The cuff was closed laparoscopically with a 2-0 Stratafix suture in a running continuous fashion  Upon closure, the pelvis was irrigated copiously with normal saline and the cuff was noted to be hemostatic      Next, the suprapubic incision was extended to 3cm in a horizontal fashion  The Edgardo retractor was introduced  A 14mm Applied specimen bag was placed into the abdomen through this minilaparotomy incision  Next, the Gelpoint cover was placed over the retractor to maintain pneumoperitoneum  Next, the specimen was placed into the bag and the tail of the bag pulled through the suprapubic incision  The edges were rolled down and the specimen inadvertently slipped out of the bag and was still in the abdomen  The specimen bag and Edgardo retractor were removed  The specimen was grasped by the cervix and pulled through the minilaparotomy  The minilaparotomy was extended to 6cm to accommodate the specimen   The specimen was able to be delivered through the minilaparotomy without manual morcellation  The fascia was closed with 0 Vicryl suture in a running stitch       The Frances catheter was removed  A diagnostic cystoscopy was performed confirming brisk efflux from bilateral ureters  Bisi Humphrey is no gross injury to the bladder lumen   At this time the procedure was concluded   The instruments and trocars were removed from the abdomen and excess CO2 gas was allowed to escape  The minilaparotomy skin was closed with 3-0 Monocryl in a subcuticular fashion and dressed with Histoacryl  The laparoscopic incisions were closed with 3-0 Plain gut interrupted sutures and the incisions were dressed with Band-Aids       The sponge, needle and instrument count were correct x 2  The patient tolerated the procedure well  She was awakened from anesthesia and transferred to the recovery room in stable condition      Dr Yanni Shukla was present for the entire procedure      Patient Disposition:  PACU     SIGNATURE: Munira Bradley MD  DATE: September 24, 2018  TIME: 1:47 PM

## 2018-09-24 NOTE — DISCHARGE INSTRUCTIONS
Laparoscopic Hysterectomy   WHAT YOU NEED TO KNOW:   A hysterectomy is surgery to remove your uterus  Your ovaries, fallopian tubes, cervix, or part of your vagina may also need to be removed  The organs and tissue that will be removed depends on your medical condition  DISCHARGE INSTRUCTIONS:   Call 911 for any of the following:   · You feel lightheaded, short of breath, and have chest pain  · You cough up blood  Seek care immediately:   · Your arm or leg feels warm, tender, and painful  It may look swollen and red  · You have increasing abdominal or pelvic pain  · You have heavy vaginal bleeding that fills 1 or more sanitary pads in 1 hour  Contact your healthcare provider or gynecologist if:   · You have a fever  · You have nausea or are vomiting  · You feel pain or burning when you urinate, or you have trouble urinating  · You have pus or a foul-smelling odor coming from your vagina  · Your wound is red, swollen, or draining pus  · You feel pressure in your rectum  · You have questions or concerns about your condition or care  Medicines: You may  need any of the following:  · Prescription pain medicine  may be given  Ask your healthcare provider how to take this medicine safely  · NSAIDs , such as ibuprofen, help decrease swelling, pain, and fever  NSAIDs can cause stomach bleeding or kidney problems in certain people  If you take blood thinner medicine, always ask your healthcare provider if NSAIDs are safe for you  Always read the medicine label and follow directions  · Stool softeners  help treat or prevent constipation  · Take your medicine as directed  Contact your healthcare provider if you think your medicine is not helping or if you have side effects  Tell him or her if you are allergic to any medicine  Keep a list of the medicines, vitamins, and herbs you take  Include the amounts, and when and why you take them   Bring the list or the pill bottles to follow-up visits  Carry your medicine list with you in case of an emergency  Activity:   · Wear an abdominal binder as directed  An abdominal binder will decrease pain when you move or cough  · Rest as needed  Get up and move around as directed to help prevent blood clots  Start with short walks and slowly increase the distance every day  Limit the number of times you climb stairs to 2 times each day  Plan most of your daily activities on one level of your home  · Do not lift objects heavier than 10 pounds for 6 weeks  Avoid strenuous activity for 2 weeks  · Do not strain during bowel movements  High-fiber foods and extra liquids can help you prevent constipation  Examples of high-fiber foods are fruit and bran  Prune juice and water are good liquids to drink  · Do not have sex, use tampons, or douche for up to 8 weeks  Ask your healthcare provider if it is okay to take a tub bath  · Do not go into pools or hot tubs for 6 weeks or as directed  · Ask when it is safe for you to drive, return to work, and return to other regular activities  Wound care:  Care for your abdominal incisions as directed  Carefully wash around the wound with soap and water  It is okay to let the soap and water run over your incision  Do not  scrub your incision  Dry the area and put on new, clean bandages as directed  Change your bandages when they get wet or dirty  If you have strips of medical tape, let them fall off on their own  It may take 7 to 14 days for them to fall off  Check your incision every day for redness, swelling, or pus  Deep breathing:  Take deep breaths and cough 10 times each hour  This will decrease your risk for a lung infection  Take a deep breath and hold it for as long as you can  Let the air out and then cough strongly  Deep breaths help open your airway  You may be given an incentive spirometer to help you take deep breaths   Put the plastic piece in your mouth and take a slow, deep breath, then let the air out and cough  Repeat these steps 10 times every hour  Get support: This surgery may be life-changing for you and your family  You will no longer be able to get pregnant  Sudden changes in the levels of your hormones may occur and cause mood swings and depression  You may feel angry, sad, or frightened, or cry frequently and unexpectedly  These feelings are normal  Talk to your healthcare provider about where you can get support  You can also ask if hormone replacement medicine is right for you  Follow up with your healthcare provider or gynecologist as directed: You may need to return to have stitches removed, and for other tests  Write down your questions so you remember to ask them during your visits  © 2017 [facility] Information is for End User's use only and may not be sold, redistributed or otherwise used for commercial purposes  All illustrations and images included in CareNotes® are the copyrighted property of A D A M , Inc  or Ortega Dobb[employer]  The above information is an  only  It is not intended as medical advice for individual conditions or treatments  Talk to your doctor, nurse or pharmacist before following any medical regimen to see if it is safe and effective for you

## 2018-10-09 ENCOUNTER — OFFICE VISIT (OUTPATIENT)
Dept: GYNECOLOGY | Facility: CLINIC | Age: 53
End: 2018-10-09

## 2018-10-09 VITALS
SYSTOLIC BLOOD PRESSURE: 144 MMHG | HEIGHT: 62 IN | DIASTOLIC BLOOD PRESSURE: 84 MMHG | BODY MASS INDEX: 30.69 KG/M2 | WEIGHT: 166.8 LBS

## 2018-10-09 DIAGNOSIS — Z48.89 POSTOPERATIVE VISIT: Primary | ICD-10-CM

## 2018-10-09 PROCEDURE — 99024 POSTOP FOLLOW-UP VISIT: CPT | Performed by: OBSTETRICS & GYNECOLOGY

## 2018-10-09 NOTE — PROGRESS NOTES
PO check   S/p [facility] BS  9/24    Path: fibroid uterus-405 grams/ adenomyosis    PE: incisions healing well    RTO 1 month for PO check

## 2018-11-06 ENCOUNTER — OFFICE VISIT (OUTPATIENT)
Dept: GYNECOLOGY | Facility: CLINIC | Age: 53
End: 2018-11-06

## 2018-11-06 VITALS
DIASTOLIC BLOOD PRESSURE: 90 MMHG | SYSTOLIC BLOOD PRESSURE: 140 MMHG | BODY MASS INDEX: 30.84 KG/M2 | WEIGHT: 167.6 LBS | HEIGHT: 62 IN

## 2018-11-06 DIAGNOSIS — Z48.89 POSTOPERATIVE VISIT: Primary | ICD-10-CM

## 2018-11-06 PROCEDURE — 99024 POSTOP FOLLOW-UP VISIT: CPT | Performed by: OBSTETRICS & GYNECOLOGY

## 2018-11-06 NOTE — PROGRESS NOTES
Patient presents today for 2nd postoperative check  She is status post T LH BS on September 24th for leiomyomata uteri  She is doing well since the surgery other than some right lower quadrant discomfort the adjacent to the lateral edge of her suprapubic incision  She has no nausea, vomiting, fever, dysuria, hematuria, diarrhea, constipation, or incisional drainage  Physical exam:  Abdomen is soft some tenderness superficial in the right lower quadrant  Pelvic exam no pelvic masses present some granulation tissue at the vault  No adnexal masses  Procedure:  A right ilioinguinal nerve block was performed  The right lower quadrant was prepped with Betadine  18 cc of 1% xylocaine with 2 cc of Kenalog was injected in the right lower quadrant    Patient tolerated procedure well    Impression postoperative check 2 /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

## 2019-04-02 ENCOUNTER — APPOINTMENT (OUTPATIENT)
Dept: LAB | Facility: MEDICAL CENTER | Age: 54
End: 2019-04-02
Payer: COMMERCIAL

## 2019-04-02 ENCOUNTER — OFFICE VISIT (OUTPATIENT)
Dept: GYNECOLOGY | Facility: CLINIC | Age: 54
End: 2019-04-02
Payer: COMMERCIAL

## 2019-04-02 VITALS
DIASTOLIC BLOOD PRESSURE: 74 MMHG | HEIGHT: 62 IN | HEART RATE: 69 BPM | BODY MASS INDEX: 31.43 KG/M2 | WEIGHT: 170.8 LBS | SYSTOLIC BLOOD PRESSURE: 138 MMHG

## 2019-04-02 DIAGNOSIS — B35.6 TINEA CRURIS: Primary | ICD-10-CM

## 2019-04-02 DIAGNOSIS — R63.5 WEIGHT GAIN: ICD-10-CM

## 2019-04-02 LAB — TSH SERPL DL<=0.05 MIU/L-ACNC: 3.24 UIU/ML (ref 0.36–3.74)

## 2019-04-02 PROCEDURE — 84443 ASSAY THYROID STIM HORMONE: CPT

## 2019-04-02 PROCEDURE — 36415 COLL VENOUS BLD VENIPUNCTURE: CPT

## 2019-04-02 PROCEDURE — 99213 OFFICE O/P EST LOW 20 MIN: CPT | Performed by: OBSTETRICS & GYNECOLOGY

## 2019-04-02 RX ORDER — CLOTRIMAZOLE AND BETAMETHASONE DIPROPIONATE 10; .64 MG/G; MG/G
CREAM TOPICAL 2 TIMES DAILY
Qty: 30 G | Refills: 0 | Status: SHIPPED | OUTPATIENT
Start: 2019-04-02 | End: 2019-06-13 | Stop reason: ALTCHOICE

## 2019-06-13 ENCOUNTER — ANNUAL EXAM (OUTPATIENT)
Dept: GYNECOLOGY | Facility: CLINIC | Age: 54
End: 2019-06-13
Payer: COMMERCIAL

## 2019-06-13 ENCOUNTER — APPOINTMENT (OUTPATIENT)
Dept: LAB | Facility: MEDICAL CENTER | Age: 54
End: 2019-06-13
Payer: COMMERCIAL

## 2019-06-13 VITALS
BODY MASS INDEX: 31.26 KG/M2 | WEIGHT: 165.6 LBS | DIASTOLIC BLOOD PRESSURE: 76 MMHG | HEIGHT: 61 IN | SYSTOLIC BLOOD PRESSURE: 140 MMHG

## 2019-06-13 DIAGNOSIS — Z01.419 ENCOUNTER FOR GYNECOLOGICAL EXAMINATION WITHOUT ABNORMAL FINDING: ICD-10-CM

## 2019-06-13 DIAGNOSIS — R25.2 MUSCLE CRAMPING: Primary | ICD-10-CM

## 2019-06-13 DIAGNOSIS — R25.2 MUSCLE CRAMPING: ICD-10-CM

## 2019-06-13 DIAGNOSIS — Z78.0 MENOPAUSE: ICD-10-CM

## 2019-06-13 DIAGNOSIS — Z12.31 ENCOUNTER FOR SCREENING MAMMOGRAM FOR MALIGNANT NEOPLASM OF BREAST: Primary | ICD-10-CM

## 2019-06-13 DIAGNOSIS — N95.2 ATROPHIC VAGINITIS: ICD-10-CM

## 2019-06-13 LAB
ALBUMIN SERPL BCP-MCNC: 3.9 G/DL (ref 3.5–5)
ALP SERPL-CCNC: 82 U/L (ref 46–116)
ALT SERPL W P-5'-P-CCNC: 32 U/L (ref 12–78)
ANION GAP SERPL CALCULATED.3IONS-SCNC: 8 MMOL/L (ref 4–13)
AST SERPL W P-5'-P-CCNC: 18 U/L (ref 5–45)
BILIRUB SERPL-MCNC: 0.46 MG/DL (ref 0.2–1)
BUN SERPL-MCNC: 13 MG/DL (ref 5–25)
CALCIUM SERPL-MCNC: 9.4 MG/DL (ref 8.3–10.1)
CHLORIDE SERPL-SCNC: 107 MMOL/L (ref 100–108)
CO2 SERPL-SCNC: 25 MMOL/L (ref 21–32)
CREAT SERPL-MCNC: 0.75 MG/DL (ref 0.6–1.3)
GFR SERPL CREATININE-BSD FRML MDRD: 93 ML/MIN/1.73SQ M
GLUCOSE P FAST SERPL-MCNC: 71 MG/DL (ref 65–99)
POTASSIUM SERPL-SCNC: 4 MMOL/L (ref 3.5–5.3)
PROT SERPL-MCNC: 7.9 G/DL (ref 6.4–8.2)
SODIUM SERPL-SCNC: 140 MMOL/L (ref 136–145)
TSH SERPL DL<=0.05 MIU/L-ACNC: 3.06 UIU/ML (ref 0.36–3.74)

## 2019-06-13 PROCEDURE — 76977 US BONE DENSITY MEASURE: CPT | Performed by: OBSTETRICS & GYNECOLOGY

## 2019-06-13 PROCEDURE — S0612 ANNUAL GYNECOLOGICAL EXAMINA: HCPCS | Performed by: OBSTETRICS & GYNECOLOGY

## 2019-06-13 PROCEDURE — 80053 COMPREHEN METABOLIC PANEL: CPT

## 2019-06-13 PROCEDURE — 36415 COLL VENOUS BLD VENIPUNCTURE: CPT

## 2019-06-13 PROCEDURE — 84443 ASSAY THYROID STIM HORMONE: CPT

## 2019-08-21 ENCOUNTER — HOSPITAL ENCOUNTER (OUTPATIENT)
Dept: MAMMOGRAPHY | Facility: MEDICAL CENTER | Age: 54
Discharge: HOME | End: 2019-08-21
Payer: COMMERCIAL

## 2019-08-21 VITALS — BODY MASS INDEX: 31.15 KG/M2 | WEIGHT: 165 LBS | HEIGHT: 61 IN

## 2019-08-21 DIAGNOSIS — Z12.31 ENCOUNTER FOR SCREENING MAMMOGRAM FOR MALIGNANT NEOPLASM OF BREAST: ICD-10-CM

## 2019-08-21 PROCEDURE — 77063 BREAST TOMOSYNTHESIS BI: CPT

## 2019-08-21 PROCEDURE — 77067 SCR MAMMO BI INCL CAD: CPT

## 2020-06-23 ENCOUNTER — ANNUAL EXAM (OUTPATIENT)
Dept: GYNECOLOGY | Facility: CLINIC | Age: 55
End: 2020-06-23
Payer: COMMERCIAL

## 2020-06-23 VITALS
DIASTOLIC BLOOD PRESSURE: 72 MMHG | HEART RATE: 62 BPM | HEIGHT: 61 IN | SYSTOLIC BLOOD PRESSURE: 110 MMHG | WEIGHT: 168 LBS | BODY MASS INDEX: 31.72 KG/M2

## 2020-06-23 DIAGNOSIS — Z01.419 ENCOUNTER FOR GYNECOLOGICAL EXAMINATION WITHOUT ABNORMAL FINDING: ICD-10-CM

## 2020-06-23 DIAGNOSIS — Z12.31 ENCOUNTER FOR SCREENING MAMMOGRAM FOR MALIGNANT NEOPLASM OF BREAST: Primary | ICD-10-CM

## 2020-06-23 PROCEDURE — S0612 ANNUAL GYNECOLOGICAL EXAMINA: HCPCS | Performed by: OBSTETRICS & GYNECOLOGY

## 2020-08-25 ENCOUNTER — HOSPITAL ENCOUNTER (OUTPATIENT)
Dept: MAMMOGRAPHY | Facility: MEDICAL CENTER | Age: 55
Discharge: HOME | End: 2020-08-25
Payer: COMMERCIAL

## 2020-08-25 VITALS — WEIGHT: 168 LBS | HEIGHT: 61 IN | BODY MASS INDEX: 31.72 KG/M2

## 2020-08-25 DIAGNOSIS — Z12.31 ENCOUNTER FOR SCREENING MAMMOGRAM FOR MALIGNANT NEOPLASM OF BREAST: ICD-10-CM

## 2020-08-25 PROCEDURE — 77067 SCR MAMMO BI INCL CAD: CPT

## 2020-08-25 PROCEDURE — 77063 BREAST TOMOSYNTHESIS BI: CPT

## 2020-10-21 ENCOUNTER — HOSPITAL ENCOUNTER (OUTPATIENT)
Dept: ULTRASOUND IMAGING | Facility: CLINIC | Age: 55
Discharge: HOME | End: 2020-10-21
Payer: COMMERCIAL

## 2020-10-21 ENCOUNTER — HOSPITAL ENCOUNTER (OUTPATIENT)
Dept: MAMMOGRAPHY | Facility: CLINIC | Age: 55
Discharge: HOME | End: 2020-10-21
Payer: COMMERCIAL

## 2020-10-21 VITALS — TEMPERATURE: 96.2 F | HEIGHT: 61 IN | BODY MASS INDEX: 31.72 KG/M2 | WEIGHT: 168 LBS

## 2020-10-21 DIAGNOSIS — R92.8 ABNORMAL MAMMOGRAM: ICD-10-CM

## 2020-10-21 DIAGNOSIS — R92.8 ABNORMAL SCREENING MAMMOGRAM: ICD-10-CM

## 2020-10-21 PROCEDURE — G0279 TOMOSYNTHESIS, MAMMO: HCPCS

## 2020-10-21 PROCEDURE — 76642 ULTRASOUND BREAST LIMITED: CPT

## 2020-10-21 PROCEDURE — 77065 DX MAMMO INCL CAD UNI: CPT

## 2021-10-22 ENCOUNTER — HOSPITAL ENCOUNTER (OUTPATIENT)
Dept: MAMMOGRAPHY | Facility: MEDICAL CENTER | Age: 56
Discharge: HOME | End: 2021-10-22
Payer: COMMERCIAL

## 2021-10-22 VITALS — WEIGHT: 168 LBS | BODY MASS INDEX: 31.72 KG/M2 | HEIGHT: 61 IN

## 2021-10-22 DIAGNOSIS — Z12.31 ENCOUNTER FOR SCREENING MAMMOGRAM FOR MALIGNANT NEOPLASM OF BREAST: ICD-10-CM

## 2021-10-22 PROCEDURE — 77067 SCR MAMMO BI INCL CAD: CPT

## 2021-10-22 PROCEDURE — 77063 BREAST TOMOSYNTHESIS BI: CPT

## 2022-06-24 ENCOUNTER — EVALUATION (OUTPATIENT)
Dept: PHYSICAL THERAPY | Facility: MEDICAL CENTER | Age: 57
End: 2022-06-24
Payer: COMMERCIAL

## 2022-06-24 DIAGNOSIS — M25.511 RIGHT SHOULDER PAIN, UNSPECIFIED CHRONICITY: ICD-10-CM

## 2022-06-24 DIAGNOSIS — M75.121 COMPLETE TEAR OF RIGHT ROTATOR CUFF, UNSPECIFIED WHETHER TRAUMATIC: Primary | ICD-10-CM

## 2022-06-24 PROCEDURE — 97161 PT EVAL LOW COMPLEX 20 MIN: CPT | Performed by: PHYSICAL THERAPIST

## 2022-06-24 NOTE — PROGRESS NOTES
PT Evaluation     Today's date: 2022  Patient name: Katya Galvez  : 1965  MRN: [id number]  Referring provider: Erica Avendano  Dx:   Encounter Diagnosis     ICD-10-CM    1  Complete tear of right rotator cuff, unspecified whether traumatic  M75 121    2  Right shoulder pain, unspecified chronicity  M25 511             Assessment  Assessment details: Pt is a pleasant 48 yo female presenting to physical therapy s/p R shoulder RC repair  Pt would benefit from skilled PT to address current impairments and return pt to pre-morbid function  Understanding of Dx/Px/POC: good   Prognosis: good    Goals  Impairment Goals  - Pt I with initial HEP in 1-2 visits  - Improve ROM equal to contralateral side in 6-8 weeks  - Increase strength to 5/5 in all affected areas in 12+ weeks    Functional Goals  - Increase FOTO to at least 62 in 16 weeks  - Patient will be independent with comprehensive HEP in 16 weeks  - Patient will be able to reach for object from shelf at shoulder height with min reports of difficulty in 6+ weeks  - Patient will be able to reach for object overhead with min to difficulty in 8+ weeks  - Patient will be able to lift/carry objects without provocation of symptoms in 10+ weeks  - Patient will be able to return to pre-morbid activity with min to no difficulty or discomfort in 12-16 weeks           Plan  Patient would benefit from: skilled physical therapy  Other planned modality interventions: Modalites prn   Planned therapy interventions: manual therapy, neuromuscular re-education, patient education, postural training, strengthening, stretching, therapeutic activities, therapeutic exercise and home exercise program  Frequency: 2x week  Duration in weeks: 16  Treatment plan discussed with: patient and family        Subjective Evaluation    History of Present Illness  Date of surgery: 2022  Mechanism of injury: Pt underwent a R shoulder RC repair on Monday    She initially did not take pain medication, but has since started to take it  Pain is not very well controlled, but is better since starting the pain meds  Pain  Current pain ratin  At best pain ratin  At worst pain ratin    Social Support    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)  Exercise history: walking, yoga, zoomba    Patient Goals  Patient goals for therapy: decreased pain, increased motion, increased strength and return to sport/leisure activities          Objective     Postural Observations  Seated posture: fair  Standing posture: fair        Observations     Additional Observation Details  Pt presents to physical therapy wearing a surgical sling on her R UE  The surgical dressing was removed to reveal multiple surgical incisions closed with suture  The wounds are healing well, there is no drainage from the wound and no evidence of infection  Wounds were cleaned and redressed  Pt was educated about post op wound care  + mild swelling R shoulder    Cervical/Thoracic Screen   Cervical range of motion within normal limits  Thoracic range of motion within normal limits    Neurological Testing     Sensation     Shoulder   Left Shoulder   Intact: light touch    Right Shoulder   Intact: light touch    Active Range of Motion   Left Shoulder   Normal active range of motion    Additional Active Range of Motion Details  R shoulder NT secondary to recency of surgery and surgical restrictions    B wrist and elbow AROM are WNL all planes     Passive Range of Motion   Left Shoulder   Normal passive range of motion    Additional Passive Range of Motion Details  R shoulder NT secondary to poor pain control at this time  PROM to be initiated at the next visit        Strength/Myotome Testing     Left Shoulder   Normal muscle strength    Additional Strength Details  R shoulder NT secondary to recency of surgery and surgical restrictions             Precautions: s/p R shoulder RC repair      Manuals             PROM NV                                                   Ther Ex 6/24            Post op HEP 2x10 ea                                                                                                       Modalities 6/24            MH PRN

## 2022-06-24 NOTE — LETTER
2022    Rosine Apgar, MD  [address] [place] [address] Livia Rizvi [id number]    Patient: Rowdy Vicente   YOB: 1965   Date of Visit: 2022     Encounter Diagnosis     ICD-10-CM    1  Complete tear of right rotator cuff, unspecified whether traumatic  M75 121    2  Right shoulder pain, unspecified chronicity  M25 511        Dear Dr Ruffin Royalty:    Thank you for your recent referral of Rowdy Vicente  Please review the attached evaluation summary from Glo's recent visit  Please verify that you agree with the plan of care by signing the attached order  If you have any questions or concerns, please do not hesitate to call  I sincerely appreciate the opportunity to share in the care of one of your patients and hope to have another opportunity to work with you in the near future  Sincerely,    Pamela Gan, PT      Referring Provider:      I certify that I have read the below Plan of Care and certify the need for these services furnished under this plan of treatment while under my care  Rosine Apgar, MD  [address] [place] [address]  Via Fax: [phone]          PT Evaluation     Today's date: 2022  Patient name: Rowdy Vicente  : 1965  MRN: [id number]  Referring provider: Woo Bravo  Dx:   Encounter Diagnosis     ICD-10-CM    1  Complete tear of right rotator cuff, unspecified whether traumatic  M75 121    2  Right shoulder pain, unspecified chronicity  M25 511             Assessment  Assessment details: Pt is a pleasant 50 yo female presenting to physical therapy s/p R shoulder RC repair  Pt would benefit from skilled PT to address current impairments and return pt to pre-morbid function      Understanding of Dx/Px/POC: good   Prognosis: good    Goals  Impairment Goals  - Pt I with initial HEP in 1-2 visits  - Improve ROM equal to contralateral side in 6-8 weeks  - Increase strength to 5/5 in all affected areas in 12+ weeks    Functional Goals  - Increase FOTO to at least 62 in 16 weeks  - Patient will be independent with comprehensive HEP in 16 weeks  - Patient will be able to reach for object from shelf at shoulder height with min reports of difficulty in 6+ weeks  - Patient will be able to reach for object overhead with min to difficulty in 8+ weeks  - Patient will be able to lift/carry objects without provocation of symptoms in 10+ weeks  - Patient will be able to return to pre-morbid activity with min to no difficulty or discomfort in 12-16 weeks           Plan  Patient would benefit from: skilled physical therapy  Other planned modality interventions: Modalites prn   Planned therapy interventions: manual therapy, neuromuscular re-education, patient education, postural training, strengthening, stretching, therapeutic activities, therapeutic exercise and home exercise program  Frequency: 2x week  Duration in weeks: 16  Treatment plan discussed with: patient and family        Subjective Evaluation    History of Present Illness  Date of surgery: 2022  Mechanism of injury: Pt underwent a R shoulder RC repair on Monday  She initially did not take pain medication, but has since started to take it  Pain is not very well controlled, but is better since starting the pain meds  Pain  Current pain ratin  At best pain ratin  At worst pain ratin    Social Support    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)  Exercise history: walking, yoga, zoomba    Patient Goals  Patient goals for therapy: decreased pain, increased motion, increased strength and return to sport/leisure activities          Objective     Postural Observations  Seated posture: fair  Standing posture: fair        Observations     Additional Observation Details  Pt presents to physical therapy wearing a surgical sling on her R UE  The surgical dressing was removed to reveal multiple surgical incisions closed with suture    The wounds are healing well, there is no drainage from the wound and no evidence of infection  Wounds were cleaned and redressed  Pt was educated about post op wound care  + mild swelling R shoulder    Cervical/Thoracic Screen   Cervical range of motion within normal limits  Thoracic range of motion within normal limits    Neurological Testing     Sensation     Shoulder   Left Shoulder   Intact: light touch    Right Shoulder   Intact: light touch    Active Range of Motion   Left Shoulder   Normal active range of motion    Additional Active Range of Motion Details  R shoulder NT secondary to recency of surgery and surgical restrictions    B wrist and elbow AROM are WNL all planes     Passive Range of Motion   Left Shoulder   Normal passive range of motion    Additional Passive Range of Motion Details  R shoulder NT secondary to poor pain control at this time  PROM to be initiated at the next visit        Strength/Myotome Testing     Left Shoulder   Normal muscle strength    Additional Strength Details  R shoulder NT secondary to recency of surgery and surgical restrictions             Precautions: s/p R shoulder RC repair      Manuals 6/24            PROM NV                                                   Ther Ex 6/24            Post op HEP 2x10 ea                                                                                                       Modalities 6/24            MH PRN

## 2022-06-28 ENCOUNTER — OFFICE VISIT (OUTPATIENT)
Dept: PHYSICAL THERAPY | Facility: MEDICAL CENTER | Age: 57
End: 2022-06-28
Payer: COMMERCIAL

## 2022-06-28 DIAGNOSIS — M25.511 RIGHT SHOULDER PAIN, UNSPECIFIED CHRONICITY: ICD-10-CM

## 2022-06-28 DIAGNOSIS — M75.121 COMPLETE TEAR OF RIGHT ROTATOR CUFF, UNSPECIFIED WHETHER TRAUMATIC: Primary | ICD-10-CM

## 2022-06-28 PROCEDURE — 97140 MANUAL THERAPY 1/> REGIONS: CPT

## 2022-06-28 NOTE — PROGRESS NOTES
Daily Note     Today's date: 2022  Patient name: Suzie Darling  : 1965  MRN: [id number]  Referring provider: Stephenie Mohr  Dx:   Encounter Diagnosis     ICD-10-CM    1  Complete tear of right rotator cuff, unspecified whether traumatic  M75 121    2  Right shoulder pain, unspecified chronicity  M25 511                   Subjective: Pt reports that she has been taking her pain meds and has been feeling a little better  Objective: See treatment diary below      Assessment: Tolerated treatment well  Patient would benefit from continued PT and PROM at this point in her recovery  Plan: Continue per plan of care        Precautions: s/p R shoulder RC repair      Manuals            PROM NV KO                                                  Ther Ex             Post op HEP 2x10 ea                                                                                                       Modalities            MH PRN 15'

## 2022-07-01 ENCOUNTER — OFFICE VISIT (OUTPATIENT)
Dept: PHYSICAL THERAPY | Facility: MEDICAL CENTER | Age: 57
End: 2022-07-01
Payer: COMMERCIAL

## 2022-07-01 DIAGNOSIS — M75.121 COMPLETE TEAR OF RIGHT ROTATOR CUFF, UNSPECIFIED WHETHER TRAUMATIC: Primary | ICD-10-CM

## 2022-07-01 DIAGNOSIS — M25.511 RIGHT SHOULDER PAIN, UNSPECIFIED CHRONICITY: ICD-10-CM

## 2022-07-01 PROCEDURE — 97140 MANUAL THERAPY 1/> REGIONS: CPT

## 2022-07-01 NOTE — PROGRESS NOTES
Daily Note     Today's date: 2022  Patient name: Jeri Vigil  : 1965  MRN: [id number]  Referring provider: Ovidio Soares  Dx:   Encounter Diagnosis     ICD-10-CM    1  Complete tear of right rotator cuff, unspecified whether traumatic  M75 121    2  Right shoulder pain, unspecified chronicity  M25 511                   Subjective: Pt reports that she took a pain pill prior to PT today  Objective: See treatment diary below      Assessment: Tolerated treatment fair  Patient guarded w/PROM and experienced discomfort throughout  Plan: Continue per plan of care        Precautions: s/p R shoulder RC repair      Manuals           PROM NV KO KO                                                 Ther Ex           Post op HEP 2x10 ea                                                                                                       Modalities           MH PRN 15' 15'

## 2022-07-05 ENCOUNTER — OFFICE VISIT (OUTPATIENT)
Dept: PHYSICAL THERAPY | Facility: MEDICAL CENTER | Age: 57
End: 2022-07-05
Payer: COMMERCIAL

## 2022-07-05 DIAGNOSIS — M25.511 RIGHT SHOULDER PAIN, UNSPECIFIED CHRONICITY: ICD-10-CM

## 2022-07-05 DIAGNOSIS — M75.121 COMPLETE TEAR OF RIGHT ROTATOR CUFF, UNSPECIFIED WHETHER TRAUMATIC: Primary | ICD-10-CM

## 2022-07-05 PROCEDURE — 97140 MANUAL THERAPY 1/> REGIONS: CPT

## 2022-07-05 NOTE — PROGRESS NOTES
Daily Note     Today's date: 2022  Patient name: Doroteo Mckay  : 1965  MRN: [id number]  Referring provider: Jose L Diez  Dx:   Encounter Diagnosis     ICD-10-CM    1  Complete tear of right rotator cuff, unspecified whether traumatic  M75 121    2  Right shoulder pain, unspecified chronicity  M25 511                   Subjective: Pt reports that she's been trying to manage her pain and is taking her prescription pain meds prior to PT  Objective: See treatment diary below      Assessment: Tolerated treatment well  Improved mobility w/PROM once pt limits her guarding  Plan: Continue per plan of care        Precautions: s/p R shoulder RC repair      Manuals  7         PROM NV KO KO KO                                                Ther Ex  7         Post op HEP 2x10 ea                                                                                                       Modalities  7         MH PRN 15' 15' 15'

## 2022-07-08 ENCOUNTER — OFFICE VISIT (OUTPATIENT)
Dept: PHYSICAL THERAPY | Facility: MEDICAL CENTER | Age: 57
End: 2022-07-08
Payer: COMMERCIAL

## 2022-07-08 DIAGNOSIS — M25.511 RIGHT SHOULDER PAIN, UNSPECIFIED CHRONICITY: Primary | ICD-10-CM

## 2022-07-08 DIAGNOSIS — M75.121 COMPLETE TEAR OF RIGHT ROTATOR CUFF, UNSPECIFIED WHETHER TRAUMATIC: ICD-10-CM

## 2022-07-08 PROCEDURE — 97140 MANUAL THERAPY 1/> REGIONS: CPT | Performed by: PHYSICAL THERAPIST

## 2022-07-08 NOTE — PROGRESS NOTES
Daily Note     Today's date: 2022  Patient name: Kilo Martin  : 1965  MRN: [id number]  Referring provider: David Saleh  Dx:   Encounter Diagnosis     ICD-10-CM    1  Right shoulder pain, unspecified chronicity  M25 511    2  Complete tear of right rotator cuff, unspecified whether traumatic  M75 121                   Subjective: Pt reports that she took some pain meds prior to PT today so she is having less pain  Objective: See treatment diary below      Assessment: Tolerated treatment fair  Patient would benefit from continued PT  PROM is gradually improving  Plan: Continue per plan of care        Precautions: s/p R shoulder RC repair      Manuals         PROM NV KO KO KO HK                                               Ther Ex          Post op HEP 2x10 ea                                                                                                       Modalities          PRN 15' 15' 15' 15'

## 2022-07-11 ENCOUNTER — OFFICE VISIT (OUTPATIENT)
Dept: PHYSICAL THERAPY | Facility: MEDICAL CENTER | Age: 57
End: 2022-07-11
Payer: COMMERCIAL

## 2022-07-11 DIAGNOSIS — M75.121 COMPLETE TEAR OF RIGHT ROTATOR CUFF, UNSPECIFIED WHETHER TRAUMATIC: ICD-10-CM

## 2022-07-11 DIAGNOSIS — M25.511 RIGHT SHOULDER PAIN, UNSPECIFIED CHRONICITY: Primary | ICD-10-CM

## 2022-07-11 PROCEDURE — 97140 MANUAL THERAPY 1/> REGIONS: CPT

## 2022-07-11 NOTE — PROGRESS NOTES
Daily Note     Today's date: 2022  Patient name: Abdoulaye Reese  : 1965  MRN: [id number]  Referring provider: Carlos Alberto Saunders  Dx:   Encounter Diagnosis     ICD-10-CM    1  Right shoulder pain, unspecified chronicity  M25 511    2  Complete tear of right rotator cuff, unspecified whether traumatic  M75 121                   Subjective: Pt reports that she's been performing her ex's regularly  Objective: See treatment diary below      Assessment: Tolerated treatment well  Patient is making small progressions in her mobility with PROM  Plan: Continue per plan of care        Precautions: s/p R shoulder RC repair      Manuals        PROM NV KO KO KO HK KO                                              Ther Ex          Post op HEP 2x10 ea                                                                                                       Modalities        MH PRN 15' 15' 15' 15' 15'

## 2022-07-12 ENCOUNTER — APPOINTMENT (OUTPATIENT)
Dept: PHYSICAL THERAPY | Facility: MEDICAL CENTER | Age: 57
End: 2022-07-12
Payer: COMMERCIAL

## 2022-07-15 ENCOUNTER — OFFICE VISIT (OUTPATIENT)
Dept: PHYSICAL THERAPY | Facility: MEDICAL CENTER | Age: 57
End: 2022-07-15
Payer: COMMERCIAL

## 2022-07-15 DIAGNOSIS — M75.121 COMPLETE TEAR OF RIGHT ROTATOR CUFF, UNSPECIFIED WHETHER TRAUMATIC: ICD-10-CM

## 2022-07-15 DIAGNOSIS — M25.511 RIGHT SHOULDER PAIN, UNSPECIFIED CHRONICITY: Primary | ICD-10-CM

## 2022-07-15 PROCEDURE — 97140 MANUAL THERAPY 1/> REGIONS: CPT

## 2022-07-15 NOTE — PROGRESS NOTES
Daily Note     Today's date: 7/15/2022  Patient name: Wing Grewal  : 1965  MRN: [id number]  Referring provider: Grace Recinos  Dx:   Encounter Diagnosis     ICD-10-CM    1  Right shoulder pain, unspecified chronicity  M25 511    2  Complete tear of right rotator cuff, unspecified whether traumatic  M75 121                   Subjective: Pt offers no new complaints  Objective: See treatment diary below      Assessment: Tolerated treatment well  Patient is making steady progress in her mobility and displays less guarding today  Pt would benefit from continued PT  Plan: Continue per plan of care        Precautions: s/p R shoulder RC repair      Manuals 6/24 6/28 7/1 7/5 7/8 7/11 7/15      PROM NV KO KO KO HK KO KO                                             Ther Ex          Post op HEP 2x10 ea                                                                                                       Modalities 6/24 6/28 7/1 7/5 7/8 7/11 7/15      MH PRN 15' 15' 15' 15' 15' 15'

## 2022-07-18 ENCOUNTER — OFFICE VISIT (OUTPATIENT)
Dept: PHYSICAL THERAPY | Facility: MEDICAL CENTER | Age: 57
End: 2022-07-18
Payer: COMMERCIAL

## 2022-07-18 DIAGNOSIS — M25.511 RIGHT SHOULDER PAIN, UNSPECIFIED CHRONICITY: Primary | ICD-10-CM

## 2022-07-18 DIAGNOSIS — M75.121 COMPLETE TEAR OF RIGHT ROTATOR CUFF, UNSPECIFIED WHETHER TRAUMATIC: ICD-10-CM

## 2022-07-18 PROCEDURE — 97140 MANUAL THERAPY 1/> REGIONS: CPT

## 2022-07-19 ENCOUNTER — APPOINTMENT (OUTPATIENT)
Dept: PHYSICAL THERAPY | Facility: MEDICAL CENTER | Age: 57
End: 2022-07-19
Payer: COMMERCIAL

## 2022-07-22 ENCOUNTER — OFFICE VISIT (OUTPATIENT)
Dept: PHYSICAL THERAPY | Facility: MEDICAL CENTER | Age: 57
End: 2022-07-22
Payer: COMMERCIAL

## 2022-07-22 DIAGNOSIS — M75.121 COMPLETE TEAR OF RIGHT ROTATOR CUFF, UNSPECIFIED WHETHER TRAUMATIC: ICD-10-CM

## 2022-07-22 DIAGNOSIS — M25.511 RIGHT SHOULDER PAIN, UNSPECIFIED CHRONICITY: Primary | ICD-10-CM

## 2022-07-22 PROCEDURE — 97140 MANUAL THERAPY 1/> REGIONS: CPT

## 2022-07-22 PROCEDURE — 97110 THERAPEUTIC EXERCISES: CPT

## 2022-07-22 NOTE — PROGRESS NOTES
Daily Note     Today's date: 2022  Patient name: Bryanna Nassar  : 1965  MRN: [id number]  Referring provider: Coco Rob  Dx:   Encounter Diagnosis     ICD-10-CM    1  Right shoulder pain, unspecified chronicity  M25 511    2  Complete tear of right rotator cuff, unspecified whether traumatic  M75 121                   Subjective: Pt reports that her shoulder is feeling pretty good  Objective: See treatment diary below      Assessment: Tolerated treatment well  Patient displayed less guarding with PROM today  No difficulties with added table slides  Pt would benefit from continued PT  Plan: Continue per plan of care        Precautions: s/p R shoulder RC repair      Manuals 6/24 6/28 7/1 7/5 7/8 7/11 7/15 7/18 7/22    PROM NV [address] [facility] [address]                                           Ther Ex     Post op HEP 2x10 ea            Table slides  Flex and ER         3x10                                                                                  Modalities 6/24 6/28 7/1 7/5 7/8 7/11 7/15 7/18 7/22     PRN 15' 15' 15' 15' 15' 15' 15' 15'

## 2022-07-25 ENCOUNTER — OFFICE VISIT (OUTPATIENT)
Dept: PHYSICAL THERAPY | Facility: MEDICAL CENTER | Age: 57
End: 2022-07-25
Payer: COMMERCIAL

## 2022-07-25 DIAGNOSIS — M25.511 RIGHT SHOULDER PAIN, UNSPECIFIED CHRONICITY: Primary | ICD-10-CM

## 2022-07-25 DIAGNOSIS — M75.121 COMPLETE TEAR OF RIGHT ROTATOR CUFF, UNSPECIFIED WHETHER TRAUMATIC: ICD-10-CM

## 2022-07-25 PROCEDURE — 97140 MANUAL THERAPY 1/> REGIONS: CPT | Performed by: PHYSICAL THERAPIST

## 2022-07-25 PROCEDURE — 97110 THERAPEUTIC EXERCISES: CPT | Performed by: PHYSICAL THERAPIST

## 2022-07-25 NOTE — PROGRESS NOTES
Daily Note     Today's date: 2022  Patient name: Saroj Guajardo  : 1965  MRN: [id number]  Referring provider: Alondra Chavarria  Dx:   Encounter Diagnosis     ICD-10-CM    1  Right shoulder pain, unspecified chronicity  M25 511    2  Complete tear of right rotator cuff, unspecified whether traumatic  M75 121                   Subjective: Pt reports that she is generally doing well  When she has pain it is in her upper arm  Objective: See treatment diary below      Assessment: Tolerated treatment well  Patient exhibited good technique with therapeutic exercises and would benefit from continued PT  Plan: Continue per plan of care        Precautions: s/p R shoulder RC repair      Manuals 6/24 6/28 7/1 7/5 7/8 7/11 7/15 7/18 7/22 7/25   PROM NV [address] [place]                                          Ther Ex    Post op HEP 2x10 ea            Table slides  Flex and ER         3x10 x30   Pulleys          6'                                                                    Modalities 6/24 6/28 7/1 7/5 7/8 7/11 7/15 7/18 7/22 7/25   MH PRN 15' 15' 15' 15' 15' 15' 15' 15' Def

## 2022-07-26 ENCOUNTER — APPOINTMENT (OUTPATIENT)
Dept: PHYSICAL THERAPY | Facility: MEDICAL CENTER | Age: 57
End: 2022-07-26
Payer: COMMERCIAL

## 2022-07-29 ENCOUNTER — OFFICE VISIT (OUTPATIENT)
Dept: PHYSICAL THERAPY | Facility: MEDICAL CENTER | Age: 57
End: 2022-07-29
Payer: COMMERCIAL

## 2022-07-29 DIAGNOSIS — M75.121 COMPLETE TEAR OF RIGHT ROTATOR CUFF, UNSPECIFIED WHETHER TRAUMATIC: ICD-10-CM

## 2022-07-29 DIAGNOSIS — M25.511 RIGHT SHOULDER PAIN, UNSPECIFIED CHRONICITY: Primary | ICD-10-CM

## 2022-07-29 PROCEDURE — 97110 THERAPEUTIC EXERCISES: CPT

## 2022-07-29 PROCEDURE — 97140 MANUAL THERAPY 1/> REGIONS: CPT

## 2022-07-29 NOTE — PROGRESS NOTES
Daily Note     Today's date: 2022  Patient name: Regulo Batch  : 1965  MRN: [id number]  Referring provider: Lisa Sloan  Dx:   Encounter Diagnosis     ICD-10-CM    1  Right shoulder pain, unspecified chronicity  M25 511    2  Complete tear of right rotator cuff, unspecified whether traumatic  M75 121                   Subjective: Pt reports that her arm continues to be painful  Pt tried sleeping in her bed last night but woke up due to pain  Objective: See treatment diary below      Assessment: Tolerated treatment well  Patient guarded w/PROM  Pt is making some progress with her mobility once she is relaxed  Pt would benefit from continued PT  Plan: Continue per plan of care        Precautions: s/p R shoulder RC repair      Manuals             PROM KO                                                   Ther Ex             Post op HEP 2x10 ea            Table slides  Flex and ER x30            Pulleys 6'                                                                             Modalities             MH 15'  post

## 2022-08-01 ENCOUNTER — OFFICE VISIT (OUTPATIENT)
Dept: PHYSICAL THERAPY | Facility: MEDICAL CENTER | Age: 57
End: 2022-08-01
Payer: COMMERCIAL

## 2022-08-01 DIAGNOSIS — M25.511 RIGHT SHOULDER PAIN, UNSPECIFIED CHRONICITY: Primary | ICD-10-CM

## 2022-08-01 DIAGNOSIS — M75.121 COMPLETE TEAR OF RIGHT ROTATOR CUFF, UNSPECIFIED WHETHER TRAUMATIC: ICD-10-CM

## 2022-08-01 PROCEDURE — 97140 MANUAL THERAPY 1/> REGIONS: CPT

## 2022-08-01 PROCEDURE — 97110 THERAPEUTIC EXERCISES: CPT

## 2022-08-01 NOTE — PROGRESS NOTES
Daily Note     Today's date: 2022  Patient name: Cheryl Amaya  : 1965  MRN: [id number]  Referring provider: Danii Dejesus*  Dx:   Encounter Diagnosis     ICD-10-CM    1  Right shoulder pain, unspecified chronicity  M25 511    2  Complete tear of right rotator cuff, unspecified whether traumatic  M75 121                   Subjective: Pt reports continued pain in her shoulder  Objective: See treatment diary below      Assessment: Tolerated treatment well  Patient would benefit from continued PT to progress in her mobility and strength  Plan: Continue per plan of care        Precautions: s/p R shoulder RC repair      Manuals            PROM KO KO                                                  Ther Ex            Post op HEP 2x10 ea            Table slides  Flex and ER x30 x30           Pulleys 6' 6'                                                                            Modalities            MH 15'  post 5'  post

## 2022-08-05 ENCOUNTER — OFFICE VISIT (OUTPATIENT)
Dept: PHYSICAL THERAPY | Facility: MEDICAL CENTER | Age: 57
End: 2022-08-05
Payer: COMMERCIAL

## 2022-08-05 DIAGNOSIS — M25.511 RIGHT SHOULDER PAIN, UNSPECIFIED CHRONICITY: Primary | ICD-10-CM

## 2022-08-05 DIAGNOSIS — M75.121 COMPLETE TEAR OF RIGHT ROTATOR CUFF, UNSPECIFIED WHETHER TRAUMATIC: ICD-10-CM

## 2022-08-05 PROCEDURE — 97110 THERAPEUTIC EXERCISES: CPT

## 2022-08-05 PROCEDURE — 97112 NEUROMUSCULAR REEDUCATION: CPT

## 2022-08-05 PROCEDURE — 97140 MANUAL THERAPY 1/> REGIONS: CPT

## 2022-08-05 NOTE — PROGRESS NOTES
Daily Note     Today's date: 2022  Patient name: Theresa Santillan  : 1965  MRN: [id number]  Referring provider: Flaquito Roberson  Dx:   Encounter Diagnosis     ICD-10-CM    1  Right shoulder pain, unspecified chronicity  M25 511    2  Complete tear of right rotator cuff, unspecified whether traumatic  M75 121                   Subjective: Pt reports that her shoulder is feeling pretty good and received a good report from surgeon  Objective: See treatment diary below      Assessment: Tolerated treatment well  Patient demonstrated fatigue post treatment, exhibited good technique with therapeutic exercises and would benefit from continued PT  Pt is responding well to current tx plan  Plan: Continue per plan of care        Precautions: s/p R shoulder RC repair      Manuals  8/5          PROM KO KO KO                                                 Ther Ex  8/5          Post op HEP 2x10 ea            Table slides  Flex and ER x30 x30 HEP          Pulleys 6' 6' 6'          UBE   3F/3B          Scap 4   5"  2x10                                                 Modalities  8/5          MH 15'  post 5'  post 15'  post

## 2022-08-08 ENCOUNTER — OFFICE VISIT (OUTPATIENT)
Dept: PHYSICAL THERAPY | Facility: MEDICAL CENTER | Age: 57
End: 2022-08-08
Payer: COMMERCIAL

## 2022-08-08 DIAGNOSIS — M75.121 COMPLETE TEAR OF RIGHT ROTATOR CUFF, UNSPECIFIED WHETHER TRAUMATIC: ICD-10-CM

## 2022-08-08 DIAGNOSIS — M25.511 RIGHT SHOULDER PAIN, UNSPECIFIED CHRONICITY: Primary | ICD-10-CM

## 2022-08-08 PROCEDURE — 97140 MANUAL THERAPY 1/> REGIONS: CPT

## 2022-08-08 PROCEDURE — 97112 NEUROMUSCULAR REEDUCATION: CPT

## 2022-08-08 PROCEDURE — 97110 THERAPEUTIC EXERCISES: CPT

## 2022-08-08 NOTE — PROGRESS NOTES
Daily Note     Today's date: 2022  Patient name: Wing Grewal  : 1965  MRN: [id number]  Referring provider: Grace Recinos  Dx:   Encounter Diagnosis     ICD-10-CM    1  Right shoulder pain, unspecified chronicity  M25 511    2  Complete tear of right rotator cuff, unspecified whether traumatic  M75 121                   Subjective: Pt reports that her shoulder felt pretty good after progressions made at   Pt notes some increased soreness today because she was using her arm a little more  Objective: See treatment diary below      Assessment: Tolerated treatment well  Patient demonstrated fatigue post treatment, exhibited good technique with therapeutic exercises and would benefit from continued PT      Plan: Continue per plan of care        Precautions: s/p R shoulder RC repair      Manuals          PROM KO KO KO KO                                                Ther Ex          Post op HEP 2x10 ea            Table slides  Flex and ER x30 x30 HEP HEP         Pulleys 6' 6' 6' 6'         UBE   3F/3B 3F/3B         Scap 4   5"  2x10 5"  3x10         SL ER    3x10                                   Modalities          MH 15'  post 5'  post 15'  post Deferred

## 2022-08-12 ENCOUNTER — OFFICE VISIT (OUTPATIENT)
Dept: PHYSICAL THERAPY | Facility: MEDICAL CENTER | Age: 57
End: 2022-08-12
Payer: COMMERCIAL

## 2022-08-12 DIAGNOSIS — M25.511 RIGHT SHOULDER PAIN, UNSPECIFIED CHRONICITY: Primary | ICD-10-CM

## 2022-08-12 DIAGNOSIS — M75.121 COMPLETE TEAR OF RIGHT ROTATOR CUFF, UNSPECIFIED WHETHER TRAUMATIC: ICD-10-CM

## 2022-08-12 PROCEDURE — 97112 NEUROMUSCULAR REEDUCATION: CPT | Performed by: PHYSICAL THERAPIST

## 2022-08-12 PROCEDURE — 97110 THERAPEUTIC EXERCISES: CPT | Performed by: PHYSICAL THERAPIST

## 2022-08-12 PROCEDURE — 97140 MANUAL THERAPY 1/> REGIONS: CPT | Performed by: PHYSICAL THERAPIST

## 2022-08-12 NOTE — PROGRESS NOTES
Daily Note     Today's date: 2022  Patient name: Joshua Ray  : 1965  MRN: [id number]  Referring provider: Eunice Damon  Dx:   Encounter Diagnosis     ICD-10-CM    1  Right shoulder pain, unspecified chronicity  M25 511    2  Complete tear of right rotator cuff, unspecified whether traumatic  M75 121                   Subjective: Pt reports that she has been doing her HEP regularly  Objective: See treatment diary below      Assessment: Tolerated treatment fair  Patient demonstrated fatigue post treatment, exhibited good technique with therapeutic exercises and would benefit from continued PT  Pt is slowly improving in all areas  Plan: Continue per plan of care        Precautions: s/p R shoulder RC repair      Manuals         PROM KO KO KO KO HK                                               Ther Ex         Post op HEP 2x10 ea            Table slides  Flex and ER x30 x30 HEP HEP x30        Pulleys 6' 6' 6' 6' 6'        UBE   3F/3B 3F/3B 3F/3B        Scap 4   5"  2x10 5"  3x10 5"  3x10        SL ER    3x10 3x12                                  Modalities          MH 15'  post 5'  post 15'  post Deferred

## 2022-08-15 ENCOUNTER — OFFICE VISIT (OUTPATIENT)
Dept: PHYSICAL THERAPY | Facility: MEDICAL CENTER | Age: 57
End: 2022-08-15
Payer: COMMERCIAL

## 2022-08-15 DIAGNOSIS — M75.121 COMPLETE TEAR OF RIGHT ROTATOR CUFF, UNSPECIFIED WHETHER TRAUMATIC: ICD-10-CM

## 2022-08-15 DIAGNOSIS — M25.511 RIGHT SHOULDER PAIN, UNSPECIFIED CHRONICITY: Primary | ICD-10-CM

## 2022-08-15 PROCEDURE — 97140 MANUAL THERAPY 1/> REGIONS: CPT | Performed by: PHYSICAL THERAPIST

## 2022-08-15 PROCEDURE — 97112 NEUROMUSCULAR REEDUCATION: CPT | Performed by: PHYSICAL THERAPIST

## 2022-08-15 PROCEDURE — 97110 THERAPEUTIC EXERCISES: CPT | Performed by: PHYSICAL THERAPIST

## 2022-08-15 NOTE — PROGRESS NOTES
Daily Note     Today's date: 8/15/2022  Patient name: Yolanda Olivo  : 1965  MRN: [id number]  Referring provider: Derek Tobias  Dx:   Encounter Diagnosis     ICD-10-CM    1  Right shoulder pain, unspecified chronicity  M25 511    2  Complete tear of right rotator cuff, unspecified whether traumatic  M75 121                   Subjective: Pt reports that her shoulder still feels tight, but improving  Objective: See treatment diary below      Assessment: Tolerated treatment well  Patient demonstrated fatigue post treatment, exhibited good technique with therapeutic exercises and would benefit from continued PT  Plan: Continue per plan of care        Precautions: s/p R shoulder RC repair      Manuals 7/29 8/1 8/5 8/8 8/12 8/15       PROM KO KO KO KO HK HK                                              Ther Ex 7/29 8/1 8/5 8/8 8/12 8/15       Post op HEP 2x10 ea            Table slides  Flex and ER x30 x30 HEP HEP x30 x30       Pulleys 6' 6' 6' 6' 6' 6'       UBE   3F/3B 3F/3B 3F/3B 3F/3B       Scap 4   5"  2x10 5"  3x10 5"  3x10 5"  x30       SL ER    3x10 3x12 3x15       UE alpha      2X       Prone pro/ret     x30 2x30       Modalities          MH 15'  post 5'  post 15'  post Deferred

## 2022-08-19 ENCOUNTER — OFFICE VISIT (OUTPATIENT)
Dept: PHYSICAL THERAPY | Facility: MEDICAL CENTER | Age: 57
End: 2022-08-19
Payer: COMMERCIAL

## 2022-08-19 DIAGNOSIS — M25.511 RIGHT SHOULDER PAIN, UNSPECIFIED CHRONICITY: Primary | ICD-10-CM

## 2022-08-19 DIAGNOSIS — M75.121 COMPLETE TEAR OF RIGHT ROTATOR CUFF, UNSPECIFIED WHETHER TRAUMATIC: ICD-10-CM

## 2022-08-19 PROCEDURE — 97140 MANUAL THERAPY 1/> REGIONS: CPT

## 2022-08-19 PROCEDURE — 97112 NEUROMUSCULAR REEDUCATION: CPT

## 2022-08-19 PROCEDURE — 97110 THERAPEUTIC EXERCISES: CPT

## 2022-08-19 NOTE — PROGRESS NOTES
Daily Note     Today's date: 2022  Patient name: Doroteo Mckay  : 1965  MRN: [id number]  Referring provider: Jose L Diez  Dx:   Encounter Diagnosis     ICD-10-CM    1  Right shoulder pain, unspecified chronicity  M25 511    2  Complete tear of right rotator cuff, unspecified whether traumatic  M75 121                   Subjective: Pt reports that she continues to have discomfort in her shoulder  Objective: See treatment diary below      Assessment: Tolerated treatment well  Patient demonstrated fatigue post treatment, exhibited good technique with therapeutic exercises and would benefit from continued PT  Pt's mobility is improving with PROM  Plan: Continue per plan of care        Precautions: s/p R shoulder RC repair      Manuals 7/29 8/1 8/5 8/8 8/12 8/15 8/19      PROM KO KO KO KO HK HK KO                                             Ther Ex 7/29 8/1 8/5 8/8 8/12 8/15 8/19      Post op HEP 2x10 ea            Table slides  Flex and ER x30 x30 HEP HEP x30 x30 x30      Pulleys 6' 6' 6' 6' 6' 6' 6'      UBE   3F/3B 3F/3B 3F/3B 3F/3B 3F/3B      Scap 4   5"  2x10 5"  3x10 5"  3x10 5"  x30 5"  x30      SL ER    3x10 3x12 3x15 3x15      UE alpha      2X 2x      Prone pro/ret     x30 2x30 2x30      Modalities          MH 15'  post 5'  post 15'  post Deferred

## 2022-08-22 ENCOUNTER — OFFICE VISIT (OUTPATIENT)
Dept: PHYSICAL THERAPY | Facility: MEDICAL CENTER | Age: 57
End: 2022-08-22
Payer: COMMERCIAL

## 2022-08-22 DIAGNOSIS — M75.121 COMPLETE TEAR OF RIGHT ROTATOR CUFF, UNSPECIFIED WHETHER TRAUMATIC: ICD-10-CM

## 2022-08-22 DIAGNOSIS — M25.511 RIGHT SHOULDER PAIN, UNSPECIFIED CHRONICITY: Primary | ICD-10-CM

## 2022-08-22 PROCEDURE — 97140 MANUAL THERAPY 1/> REGIONS: CPT | Performed by: PHYSICAL THERAPIST

## 2022-08-22 PROCEDURE — 97110 THERAPEUTIC EXERCISES: CPT | Performed by: PHYSICAL THERAPIST

## 2022-08-22 PROCEDURE — 97112 NEUROMUSCULAR REEDUCATION: CPT | Performed by: PHYSICAL THERAPIST

## 2022-08-22 NOTE — PROGRESS NOTES
Daily Note     Today's date: 2022  Patient name: Crystal Alcantara  : 1965  MRN: [id number]  Referring provider: Layne Guillermo  Dx:   Encounter Diagnosis     ICD-10-CM    1  Right shoulder pain, unspecified chronicity  M25 511    2  Complete tear of right rotator cuff, unspecified whether traumatic  M75 121                   Subjective: Pt reports that her shoulder ROM is significantly improved over the past few weeks  She reports pain with certain movement, but it is not limited  Objective: See treatment diary below      Assessment: Tolerated treatment well  Patient demonstrated fatigue post treatment, exhibited good technique with therapeutic exercises and would benefit from continued PT  Pt is slowly improving in all areas  Plan: Continue per plan of care        Precautions: s/p R shoulder RC repair      Manuals 7/29 8/1 8/5 8/8 8/12 8/15 8/19 8/22     PROM KO KO KO KO HK HK KO HK     [address] [facility] [address] all planes        HK                               Ther Ex 7/29 8/1 8/5 8/8 8/12 8/15 8/19 8/22     Post op HEP 2x10 ea            Table slides  Flex and ER x30 x30 HEP HEP x30 x30 x30 HEP     Pulleys 6' 6' 6' 6' 6' 6' 6' 6'     UBE   3F/3B 3F/3B 3F/3B 3F/3B 3F/3B 3F/3B     Scap 4   5"  2x10 5"  3x10 5"  3x10 5"  x30 5"  x30 Red  3x10     SL ER    3x10 3x12 3x15 3x15 3x15     UE alpha      2X 2x 3x     Prone pro/ret     x30 2x30 2x30 2x30     Modalities          MH 15'  post 5'  post 15'  post Deferred

## 2022-08-26 ENCOUNTER — OFFICE VISIT (OUTPATIENT)
Dept: PHYSICAL THERAPY | Facility: MEDICAL CENTER | Age: 57
End: 2022-08-26
Payer: COMMERCIAL

## 2022-08-26 DIAGNOSIS — M75.121 COMPLETE TEAR OF RIGHT ROTATOR CUFF, UNSPECIFIED WHETHER TRAUMATIC: ICD-10-CM

## 2022-08-26 DIAGNOSIS — M25.511 RIGHT SHOULDER PAIN, UNSPECIFIED CHRONICITY: Primary | ICD-10-CM

## 2022-08-26 PROCEDURE — 97112 NEUROMUSCULAR REEDUCATION: CPT

## 2022-08-26 PROCEDURE — 97110 THERAPEUTIC EXERCISES: CPT

## 2022-08-26 PROCEDURE — 97140 MANUAL THERAPY 1/> REGIONS: CPT

## 2022-08-26 NOTE — PROGRESS NOTES
Daily Note     Today's date: 2022  Patient name: Bere Snowden  : 1965  MRN: [id number]  Referring provider: Екатерина Fernandez  Dx:   Encounter Diagnosis     ICD-10-CM    1  Right shoulder pain, unspecified chronicity  M25 511    2  Complete tear of right rotator cuff, unspecified whether traumatic  M75 121                   Subjective: Pt reports that her shoulder is feeling better and was able to sleep throughout the whole night last night  Objective: See treatment diary below      Assessment: Tolerated treatment well  Patient demonstrated fatigue post treatment, exhibited good technique with therapeutic exercises and would benefit from continued PT  Pt is making slow, but steady progress  Plan: Continue per plan of care        Precautions: s/p R shoulder RC repair      Manuals 7/29 8/1 8/5 8/8 8/12 8/15 8/19 8/22 8/26    PROM KO KO KO KO HK HK KO HK KO    GH jt mobs all planes        HK     Shoulder stretching routine         KO                 Ther Ex 7/29 8/1 8/5 8/8 8/12 8/15 8/19 8/22 8/26    Post op HEP 2x10 ea            Table slides  Flex and ER x30 x30 HEP HEP x30 x30 x30 HEP     Pulleys 6' 6' 6' 6' 6' 6' 6' 6' 6'    UBE   3F/3B 3F/3B 3F/3B 3F/3B 3F/3B 3F/3B 3F/3B    Scap 4   5"  2x10 5"  3x10 5"  3x10 5"  x30 5"  x30 Red  3x10 Red  3x12    SL ER    3x10 3x12 3x15 3x15 3x15 3x15    UE alpha      2X 2x 3x 3x    Prone pro/ret     x30 2x30 2x30 2x30 2x30    Modalities          MH 15'  post 5'  post 15'  post Deferred

## 2022-08-29 ENCOUNTER — OFFICE VISIT (OUTPATIENT)
Dept: PHYSICAL THERAPY | Facility: MEDICAL CENTER | Age: 57
End: 2022-08-29
Payer: COMMERCIAL

## 2022-08-29 DIAGNOSIS — M75.121 COMPLETE TEAR OF RIGHT ROTATOR CUFF, UNSPECIFIED WHETHER TRAUMATIC: ICD-10-CM

## 2022-08-29 DIAGNOSIS — M25.511 RIGHT SHOULDER PAIN, UNSPECIFIED CHRONICITY: Primary | ICD-10-CM

## 2022-08-29 PROCEDURE — 97140 MANUAL THERAPY 1/> REGIONS: CPT

## 2022-08-29 PROCEDURE — 97110 THERAPEUTIC EXERCISES: CPT

## 2022-08-29 PROCEDURE — 97112 NEUROMUSCULAR REEDUCATION: CPT

## 2022-08-29 NOTE — PROGRESS NOTES
Daily Note     Today's date: 2022  Patient name: Michael Miller  : 1965  MRN: [id number]  Referring provider: Trevin Flores*  Dx:   Encounter Diagnosis     ICD-10-CM    1  Right shoulder pain, unspecified chronicity  M25 511    2  Complete tear of right rotator cuff, unspecified whether traumatic  M75 121                   Subjective: Pt offers no new comments or complaints  Objective: See treatment diary below      Assessment: Tolerated treatment well  Patient demonstrated fatigue post treatment, exhibited good technique with therapeutic exercises and would benefit from continued PT        Plan: Continue per plan of care        Precautions: s/p R shoulder RC repair      Manuals 7/29 8/1 8/5 8/8 8/12 8/15 8/19 8/22 8/26 8/29   PROM KO KO KO KO HK HK KO HK KO KO   GH jt mobs all planes        HK     Shoulder stretching routine         KO KO                Ther Ex 7/29 8/1 8/5 8/8 8/12 8/15 8/19 8/22 8/26 8/29   Post op HEP 2x10 ea            Table slides  Flex and ER x30 x30 HEP HEP x30 x30 x30 HEP     Pulleys 6' 6' 6' 6' 6' 6' 6' 6' 6' 6'   UBE   3F/3B 3F/3B 3F/3B 3F/3B 3F/3B 3F/3B 3F/3B 3F/3B   Scap 4   5"  2x10 5"  3x10 5"  3x10 5"  x30 5"  x30 Red  3x10 Red  3x12 Red  3x12     SL ER    3x10 3x12 3x15 3x15 3x15 3x15 3x15   UE alpha      2X 2x 3x 3x 3x   Prone pro/ret     x30 2x30 2x30 2x30 2x30 2x30   Modalities          MH 15'  post 5'  post 15'  post Deferred

## 2022-09-02 ENCOUNTER — OFFICE VISIT (OUTPATIENT)
Dept: PHYSICAL THERAPY | Facility: MEDICAL CENTER | Age: 57
End: 2022-09-02
Payer: COMMERCIAL

## 2022-09-02 DIAGNOSIS — M75.121 COMPLETE TEAR OF RIGHT ROTATOR CUFF, UNSPECIFIED WHETHER TRAUMATIC: ICD-10-CM

## 2022-09-02 DIAGNOSIS — M25.511 RIGHT SHOULDER PAIN, UNSPECIFIED CHRONICITY: Primary | ICD-10-CM

## 2022-09-02 PROCEDURE — 97110 THERAPEUTIC EXERCISES: CPT | Performed by: PHYSICAL THERAPIST

## 2022-09-02 PROCEDURE — 97112 NEUROMUSCULAR REEDUCATION: CPT | Performed by: PHYSICAL THERAPIST

## 2022-09-02 PROCEDURE — 97140 MANUAL THERAPY 1/> REGIONS: CPT | Performed by: PHYSICAL THERAPIST

## 2022-09-06 ENCOUNTER — APPOINTMENT (OUTPATIENT)
Dept: PHYSICAL THERAPY | Facility: MEDICAL CENTER | Age: 57
End: 2022-09-06
Payer: COMMERCIAL

## 2022-09-08 ENCOUNTER — OFFICE VISIT (OUTPATIENT)
Dept: PHYSICAL THERAPY | Facility: MEDICAL CENTER | Age: 57
End: 2022-09-08
Payer: COMMERCIAL

## 2022-09-08 DIAGNOSIS — M75.121 COMPLETE TEAR OF RIGHT ROTATOR CUFF, UNSPECIFIED WHETHER TRAUMATIC: ICD-10-CM

## 2022-09-08 DIAGNOSIS — M25.511 RIGHT SHOULDER PAIN, UNSPECIFIED CHRONICITY: Primary | ICD-10-CM

## 2022-09-08 PROCEDURE — 97112 NEUROMUSCULAR REEDUCATION: CPT | Performed by: PHYSICAL THERAPIST

## 2022-09-08 PROCEDURE — 97110 THERAPEUTIC EXERCISES: CPT | Performed by: PHYSICAL THERAPIST

## 2022-09-08 PROCEDURE — 97140 MANUAL THERAPY 1/> REGIONS: CPT | Performed by: PHYSICAL THERAPIST

## 2022-09-08 NOTE — PROGRESS NOTES
Daily Note     Today's date: 2022  Patient name: Vanessa Dean  : 1965  MRN: [id number]  Referring provider: Edgardo Richards  Dx:   Encounter Diagnosis     ICD-10-CM    1  Right shoulder pain, unspecified chronicity  M25 511    2  Complete tear of right rotator cuff, unspecified whether traumatic  M75 121                   Subjective: Pt with no new c/o to report  Objective: See treatment diary below      Assessment: Tolerated treatment well  Patient demonstrated fatigue post treatment, exhibited good technique with therapeutic exercises and would benefit from continued PT  Pt is making steady progress in all areas  Plan: Continue per plan of care        Precautions: s/p R shoulder RC repair      Manuals            PROM HK HK           GH jt mobs all planes HK HK           Shoulder stretching routine HK HK                        Ther Ex            Table slides  Flex and ER x30 x30           Wall slides Sup  3x10 Stand  3x10           Pulleys 6' 6'           UBE 3F/3B 3F/3B           Scap 4 Red  3x15 Green  3x10           SL flexion  3x10           SL ER 3x15 3x15           UE alpha 3x 3x  sup           Prone pro/ret 2x30 2x30           Modalities            MH 15'  post def

## 2022-09-09 ENCOUNTER — OFFICE VISIT (OUTPATIENT)
Dept: PHYSICAL THERAPY | Facility: MEDICAL CENTER | Age: 57
End: 2022-09-09
Payer: COMMERCIAL

## 2022-09-09 DIAGNOSIS — M25.511 RIGHT SHOULDER PAIN, UNSPECIFIED CHRONICITY: Primary | ICD-10-CM

## 2022-09-09 DIAGNOSIS — M75.121 COMPLETE TEAR OF RIGHT ROTATOR CUFF, UNSPECIFIED WHETHER TRAUMATIC: ICD-10-CM

## 2022-09-09 PROCEDURE — 97112 NEUROMUSCULAR REEDUCATION: CPT | Performed by: PHYSICAL THERAPIST

## 2022-09-09 PROCEDURE — 97110 THERAPEUTIC EXERCISES: CPT | Performed by: PHYSICAL THERAPIST

## 2022-09-09 PROCEDURE — 97140 MANUAL THERAPY 1/> REGIONS: CPT | Performed by: PHYSICAL THERAPIST

## 2022-09-09 NOTE — PROGRESS NOTES
Daily Note     Today's date: 2022  Patient name: Vanda Hull  : 1965  MRN: [id number]  Referring provider: Turner Fuentes*  Dx:   Encounter Diagnosis     ICD-10-CM    1  Right shoulder pain, unspecified chronicity  M25 511    2  Complete tear of right rotator cuff, unspecified whether traumatic  M75 121                   Subjective: Pt reports that her shoulder is improving  Objective: See treatment diary below      Assessment: Tolerated treatment well  Patient demonstrated fatigue post treatment, exhibited good technique with therapeutic exercises and would benefit from continued PT  Plan: Continue per plan of care        Precautions: s/p R shoulder RC repair      Manuals           PROM HK HK HK          GH jt mobs all planes HK HK HK          Shoulder stretching routine HK HK HK                       Ther Ex           Table slides  Flex and ER x30 x30 x30          Wall slides Sup  3x10 Stand  3x10 Stand  3x10          Pulleys 6' 6' 6'          UBE 3F/3B 3F/3B 3F/3B          Scap 4 Red  3x15 Green  3x10 Green  3x12          SL flexion  3x10 3x10          SL ER 3x15 3x15           UE alpha 3x 3x  sup 3X  Sup           Prone pro/ret 2x30 2x30 2x30          Modalities            MH 15'  post def

## 2022-09-12 ENCOUNTER — OFFICE VISIT (OUTPATIENT)
Dept: PHYSICAL THERAPY | Facility: MEDICAL CENTER | Age: 57
End: 2022-09-12
Payer: COMMERCIAL

## 2022-09-12 DIAGNOSIS — M75.121 COMPLETE TEAR OF RIGHT ROTATOR CUFF, UNSPECIFIED WHETHER TRAUMATIC: ICD-10-CM

## 2022-09-12 DIAGNOSIS — M25.511 RIGHT SHOULDER PAIN, UNSPECIFIED CHRONICITY: Primary | ICD-10-CM

## 2022-09-12 PROCEDURE — 97112 NEUROMUSCULAR REEDUCATION: CPT

## 2022-09-12 PROCEDURE — 97140 MANUAL THERAPY 1/> REGIONS: CPT

## 2022-09-12 PROCEDURE — 97110 THERAPEUTIC EXERCISES: CPT

## 2022-09-12 NOTE — PROGRESS NOTES
Daily Note     Today's date: 2022  Patient name: Roberta Cardenas  : 1965  MRN: [id number]  Referring provider: Linda Moyer  Dx:   Encounter Diagnosis     ICD-10-CM    1  Right shoulder pain, unspecified chronicity  M25 511    2  Complete tear of right rotator cuff, unspecified whether traumatic  M75 121                   Subjective: Pt reports that her shoulder was in more pain on Saturday, but feels ok today  Objective: See treatment diary below      Assessment: Tolerated treatment well  Patient demonstrated fatigue post treatment, exhibited good technique with therapeutic exercises and would benefit from continued PT  Pt's mobility is improving but is challenged with [facility] activities such as wall slide ex's  Plan: Continue per plan of care        Precautions: s/p R shoulder RC repair      Manuals          PROM HK HK HK KO         GH jt mobs all planes  HK HK NP         Shoulder stretching routine [facility] HK NP                      Ther Ex          Table slides  Flex and ER x30 x30 x30 x30         Wall slides Sup  3x10 Stand  3x10 Stand  3x10 Stand  3x10         Pulleys 6' 6' 6' 6'         UBE 3F/3B 3F/3B 3F/3B 3F/3B         Scap 4 Red  3x15 Green  3x10 Green  3x12 Green  3x15         SL flexion  3x10 3x10 3x10         SL ER 3x15 3x15  1#  3x10         UE alpha 3x 3x  sup 3X  Sup  3x  sup         Prone pro/ret 2x30 2x30 2x30 2x30         Modalities          MH 15'  post def  def

## 2022-09-14 ENCOUNTER — OFFICE VISIT (OUTPATIENT)
Dept: PHYSICAL THERAPY | Facility: MEDICAL CENTER | Age: 57
End: 2022-09-14
Payer: COMMERCIAL

## 2022-09-14 DIAGNOSIS — M75.121 COMPLETE TEAR OF RIGHT ROTATOR CUFF, UNSPECIFIED WHETHER TRAUMATIC: ICD-10-CM

## 2022-09-14 DIAGNOSIS — M25.511 RIGHT SHOULDER PAIN, UNSPECIFIED CHRONICITY: Primary | ICD-10-CM

## 2022-09-14 PROCEDURE — 97140 MANUAL THERAPY 1/> REGIONS: CPT

## 2022-09-14 PROCEDURE — 97112 NEUROMUSCULAR REEDUCATION: CPT

## 2022-09-14 PROCEDURE — 97110 THERAPEUTIC EXERCISES: CPT

## 2022-09-14 NOTE — PROGRESS NOTES
Daily Note     Today's date: 2022  Patient name: Kilo Martin  : 1965  MRN: [id number]  Referring provider: David Saleh  Dx:   Encounter Diagnosis     ICD-10-CM    1  Right shoulder pain, unspecified chronicity  M25 511    2  Complete tear of right rotator cuff, unspecified whether traumatic  M75 121                   Subjective: Pt is concerned that she cannot lift OH yet and is afraid that she might have a setback with her mobility while away for 2+ weeks  Objective: See treatment diary below      Assessment: Tolerated treatment well  Patient demonstrated fatigue post treatment, exhibited good technique with therapeutic exercises and would benefit from continued PT  Pt is limited in her mobility and strength  Plan: Continue per plan of care        Precautions: s/p R shoulder RC repair      Manuals         PROM HK HK HK KO KO        [facility] jt mobs all planes HK HK HK NP         Shoulder stretching routine HK HK HK NP KO                     Ther Ex         Table slides  Flex and ER x30 x30 x30 x30 x30        Wall slides Sup  3x10 Stand  3x10 Stand  3x10 Stand  3x10 Stand  3x10        Pulleys 6' 6' 6' 6' 6'        UBE 3F/3B 3F/3B 3F/3B 3F/3B 3F/3B        Scap 4 Red  3x15 Green  3x10 Green  3x12 Green  3x15 Green  3x15        SL flexion  3x10 3x10 3x10 3x10        SL ER 3x15 3x15  1#  3x10 1#  3x12        UE alpha 3x 3x  sup 3X  Sup  3x  sup 3x  sup        Prone pro/ret 2x30 2x30 2x30 2x30         BTH ER stretch     5"  2x10        Belly Presses     5"  3x10                                  Modalities         MH 15'  post def  def

## 2022-09-15 ENCOUNTER — OFFICE VISIT (OUTPATIENT)
Dept: PHYSICAL THERAPY | Facility: MEDICAL CENTER | Age: 57
End: 2022-09-15
Payer: COMMERCIAL

## 2022-09-15 DIAGNOSIS — M75.121 COMPLETE TEAR OF RIGHT ROTATOR CUFF, UNSPECIFIED WHETHER TRAUMATIC: ICD-10-CM

## 2022-09-15 DIAGNOSIS — M25.511 RIGHT SHOULDER PAIN, UNSPECIFIED CHRONICITY: Primary | ICD-10-CM

## 2022-09-15 PROCEDURE — 97140 MANUAL THERAPY 1/> REGIONS: CPT

## 2022-09-15 PROCEDURE — 97110 THERAPEUTIC EXERCISES: CPT

## 2022-09-15 PROCEDURE — 97112 NEUROMUSCULAR REEDUCATION: CPT

## 2022-09-15 NOTE — PROGRESS NOTES
Daily Note     Today's date: 9/15/2022  Patient name: Josesito Fischer  : 1965  MRN: [id number]  Referring provider: Linda Fam  Dx:   Encounter Diagnosis     ICD-10-CM    1  Right shoulder pain, unspecified chronicity  M25 511    2  Complete tear of right rotator cuff, unspecified whether traumatic  M75 121                   Subjective: Pt offers no new comments or complaints  Objective: See treatment diary below      Assessment: Tolerated treatment well  Patient demonstrated fatigue post treatment, exhibited good technique with therapeutic exercises and would benefit from continued PT  Pt will be away for two weeks and will resume PT upon return  Plan: Continue per plan of care        Precautions: s/p R shoulder RC repair      Manuals 9/2 9/8 9/9 9/12 9/14 9/15       PROM HK HK HK KO KO HK       GH jt mobs all planes HK HK HK NP         Shoulder stretching routine HK HK HK NP KO HK                    Ther Ex 9/2 9/8 9/9 9/12 9/14 9/15       Table slides  Flex and ER x30 x30 x30 x30 x30 x30       Wall slides Sup  3x10 Stand  3x10 Stand  3x10 Stand  3x10 Stand  3x10 Stand  3x10       Pulleys 6' 6' 6' 6' 6' 6'       UBE 3F/3B 3F/3B 3F/3B 3F/3B 3F/3B 3F/3B       Scap 4 Red  3x15 Green  3x10 Green  3x12 Green  3x15 Green  3x15 Green  3x15       SL flexion  3x10 3x10 3x10 3x10 3x10       SL ER 3x15 3x15  1#  3x10 1#  3x12 1#  3x12       UE alpha 3x 3x  sup 3X  Sup  3x  sup 3x  sup 3x  sup       Prone pro/ret 2x30 2x30 2x30 2x30  2x30       BTH ER stretch     5"  2x10 5"  3x10       Belly Presses     5"  3x10 5"  3x10                                 Modalities         MH 15'  post def  def

## 2022-10-03 ENCOUNTER — OFFICE VISIT (OUTPATIENT)
Dept: PHYSICAL THERAPY | Facility: MEDICAL CENTER | Age: 57
End: 2022-10-03
Payer: COMMERCIAL

## 2022-10-03 DIAGNOSIS — M25.511 RIGHT SHOULDER PAIN, UNSPECIFIED CHRONICITY: Primary | ICD-10-CM

## 2022-10-03 DIAGNOSIS — M75.121 COMPLETE TEAR OF RIGHT ROTATOR CUFF, UNSPECIFIED WHETHER TRAUMATIC: ICD-10-CM

## 2022-10-03 PROCEDURE — 97140 MANUAL THERAPY 1/> REGIONS: CPT | Performed by: PHYSICAL THERAPIST

## 2022-10-03 PROCEDURE — 97110 THERAPEUTIC EXERCISES: CPT | Performed by: PHYSICAL THERAPIST

## 2022-10-03 PROCEDURE — 97112 NEUROMUSCULAR REEDUCATION: CPT | Performed by: PHYSICAL THERAPIST

## 2022-10-03 NOTE — PROGRESS NOTES
Daily Note     Today's date: 10/3/2022  Patient name: Gilda Colmenares  : 1965  MRN: [id number]  Referring provider: Jose Aguilar  Dx:   Encounter Diagnosis     ICD-10-CM    1  Right shoulder pain, unspecified chronicity  M25 511    2  Complete tear of right rotator cuff, unspecified whether traumatic  M75 121                   Subjective: Pt reports that she did her exercises regularly while away  She is overall having less pain in her R shoulder  Objective: See treatment diary below      Assessment: Tolerated treatment well  Patient demonstrated fatigue post treatment, exhibited good technique with therapeutic exercises and would benefit from continued PT  Pt would benefit from continued, progressive strengthening  Plan: Continue per plan of care        Precautions: s/p R shoulder RC repair      Manuals 9/2 9/8 9/9 9/12 9/14 9/15 10/3      PROM HK HK HK KO KO HK HK      GH jt mobs all planes HK HK HK NP   HK      Shoulder stretching routine HK HK HK NP KO HK HK                   Ther Ex 9/2 9/8 9/9 9/12 9/14 9/15 103      Table slides  Flex and ER x30 x30 x30 x30 x30 x30 HEP      Wall slides Sup  3x10 Stand  3x10 Stand  3x10 Stand  3x10 Stand  3x10 Stand  3x10 Stand  3x15      Pulleys 6' 6' 6' 6' 6' 6' 6'      UBE 3F/3B 3F/3B 3F/3B 3F/3B 3F/3B 3F/3B 3F/3B      Scap 4 Red  3x15 Green  3x10 Green  3x12 Green  3x15 Green  3x15 Green  3x15 Green  3x156      SL flexion  3x10 3x10 3x10 3x10 3x10 3x15      SL ER 3x15 3x15  1#  3x10 1#  3x12 1#  3x12 2#  3x10      UE alpha 3x 3x  sup 3X  Sup  3x  sup 3x  sup 3x  sup 3x  2#      Prone pro/ret 2x30 2x30 2x30 2x30  2x30 2x30      BTH ER stretch     5"  2x10 5"  3x10 HEP      Belly Presses     5"  3x10 5"  3x10 5"  x30      Prone raises x 3       2x10                   Modalities         MH 15'  post def  def

## 2022-10-04 ENCOUNTER — OFFICE VISIT (OUTPATIENT)
Dept: PHYSICAL THERAPY | Facility: MEDICAL CENTER | Age: 57
End: 2022-10-04
Payer: COMMERCIAL

## 2022-10-04 DIAGNOSIS — M75.121 COMPLETE TEAR OF RIGHT ROTATOR CUFF, UNSPECIFIED WHETHER TRAUMATIC: ICD-10-CM

## 2022-10-04 DIAGNOSIS — M25.511 RIGHT SHOULDER PAIN, UNSPECIFIED CHRONICITY: Primary | ICD-10-CM

## 2022-10-04 PROCEDURE — 97112 NEUROMUSCULAR REEDUCATION: CPT

## 2022-10-04 PROCEDURE — 97140 MANUAL THERAPY 1/> REGIONS: CPT

## 2022-10-04 PROCEDURE — 97110 THERAPEUTIC EXERCISES: CPT

## 2022-10-04 NOTE — PROGRESS NOTES
Daily Note     Today's date: 10/4/2022  Patient name: Melly Nava  : 1965  MRN: [id number]  Referring provider: Denys Love  Dx:   Encounter Diagnosis     ICD-10-CM    1  Right shoulder pain, unspecified chronicity  M25 511    2  Complete tear of right rotator cuff, unspecified whether traumatic  M75 121                   Subjective: Pt reports that her shoulder is a little sore  Objective: See treatment diary below      Assessment: Tolerated treatment well  Patient demonstrated fatigue post treatment, exhibited good technique with therapeutic exercises and would benefit from continued PT  Pt is making progress and responding well to current tx plan  Plan: Continue per plan of care        Precautions: s/p R shoulder RC repair      Manuals 9/2 9/8 9/9 9/12 9/14 9/15 10/3 10     PROM HK HK HK KO KO HK HK KO     [address] jt mobs all planes HK HK HK NP   HK      Shoulder stretching routine HK HK HK NP KO HK HK KO                  Ther Ex 9/2 9/8 9/9 9/12 9/14 9/15 10/3 104     Table slides  Flex and ER x30 x30 x30 x30 x30 x30 HEP HEP     Wall slides Sup  3x10 Stand  3x10 Stand  3x10 Stand  3x10 Stand  3x10 Stand  3x10 Stand  3x15 Stand  3x15     Pulleys 6' 6' 6' 6' 6' 6' 6' 6'     UBE 3F/3B 3F/3B 3F/3B 3F/3B 3F/3B 3F/3B 3F/3B 3F/3B     Scap 4 Red  3x15 Green  3x10 Green  3x12 Green  3x15 Green  3x15 Green  3x15 Green  3x156 Green  3x15     SL flexion  3x10 3x10 3x10 3x10 3x10 3x15 3x15     SL ER 3x15 3x15  1#  3x10 1#  3x12 1#  3x12 2#  3x10 2#  3x10     UE alpha 3x 3x  sup 3X  Sup  3x  sup 3x  sup 3x  sup 3x  2# 3x  2#     Prone pro/ret 2x30 2x30 2x30 2x30  2x30 2x30 2x30     BTH ER stretch     5"  2x10 5"  3x10 HEP HEP     Belly Presses     5"  3x10 5"  3x10 5"  x30 5"  x30     Prone raises x 3       2x10 3x10                  Modalities    104     MH 15'  post def  def    15'

## 2022-10-05 ENCOUNTER — APPOINTMENT (OUTPATIENT)
Dept: PHYSICAL THERAPY | Facility: MEDICAL CENTER | Age: 57
End: 2022-10-05

## 2022-10-10 ENCOUNTER — OFFICE VISIT (OUTPATIENT)
Dept: PHYSICAL THERAPY | Facility: MEDICAL CENTER | Age: 57
End: 2022-10-10
Payer: COMMERCIAL

## 2022-10-10 DIAGNOSIS — M25.511 RIGHT SHOULDER PAIN, UNSPECIFIED CHRONICITY: Primary | ICD-10-CM

## 2022-10-10 DIAGNOSIS — M75.121 COMPLETE TEAR OF RIGHT ROTATOR CUFF, UNSPECIFIED WHETHER TRAUMATIC: ICD-10-CM

## 2022-10-10 PROCEDURE — 97112 NEUROMUSCULAR REEDUCATION: CPT

## 2022-10-10 PROCEDURE — 97110 THERAPEUTIC EXERCISES: CPT

## 2022-10-10 PROCEDURE — 97140 MANUAL THERAPY 1/> REGIONS: CPT

## 2022-10-10 NOTE — PROGRESS NOTES
Daily Note     Today's date: 10/10/2022  Patient name: Malina Dove  : 1965  MRN: [id number]  Referring provider: Jay Jay Bonilla  Dx:   Encounter Diagnosis     ICD-10-CM    1  Right shoulder pain, unspecified chronicity  M25 511    2  Complete tear of right rotator cuff, unspecified whether traumatic  M75 121                   Subjective: Pt reports that her shoulder is feeling good and can reach further OH now  Objective: See treatment diary below      Assessment: Tolerated treatment well  Patient demonstrated fatigue post treatment, exhibited good technique with therapeutic exercises and would benefit from continued PT  Pt's mobility is improving and is making progress towards her goals  Plan: Continue per plan of care        Precautions: s/p R shoulder RC repair      Manuals 9/2 9/8 9/9 9/12 9/14 9/15 10/3 10/4 10/10    PROM HK HK HK KO KO HK HK KO KO    GH jt mobs all planes HK HK HK NP   HK      Shoulder stretching routine HK HK HK NP KO HK HK KO KO                 Ther Ex 9/2 9/8 9/9 9/12 9/14 9/15 10/3 10/4 10/10    Table slides  Flex and ER x30 x30 x30 x30 x30 x30 HEP HEP     Wall slides Sup  3x10 Stand  3x10 Stand  3x10 Stand  3x10 Stand  3x10 Stand  3x10 Stand  3x15 Stand  3x15 Stand    Pulleys 6' 6' 6' 6' 6' 6' 6' 6' 6'    UBE 3F/3B 3F/3B 3F/3B 3F/3B 3F/3B 3F/3B 3F/3B 3F/3B 3F/3B    Scap 4 Red  3x15 Green  3x10 Green  3x12 Green  3x15 Green  3x15 Green  3x15 Green  3x156 Green  3x15 Green  3x15    SL flexion  3x10 3x10 3x10 3x10 3x10 3x15 3x15 3x15    SL ER 3x15 3x15  1#  3x10 1#  3x12 1#  3x12 2#  3x10 2#  3x10 2#  3x10    UE alpha 3x 3x  sup 3X  Sup  3x  sup 3x  sup 3x  sup 3x  2# 3x  2# 3x  2#    Prone pro/ret 2x30 2x30 2x30 2x30  2x30 2x30 2x30 2x30    BTH ER stretch     5"  2x10 5"  3x10 HEP HEP HEP    Belly Presses     5"  3x10 5"  3x10 5"  x30 5"  x30 5"  x30    Prone raises x 3       2x10 3x10 3x10                 Modalities    10/     MH 15'  post def  def    15'

## 2022-10-11 ENCOUNTER — APPOINTMENT (OUTPATIENT)
Dept: PHYSICAL THERAPY | Facility: MEDICAL CENTER | Age: 57
End: 2022-10-11

## 2022-10-19 ENCOUNTER — OFFICE VISIT (OUTPATIENT)
Dept: PHYSICAL THERAPY | Facility: MEDICAL CENTER | Age: 57
End: 2022-10-19

## 2022-10-19 DIAGNOSIS — M25.511 RIGHT SHOULDER PAIN, UNSPECIFIED CHRONICITY: Primary | ICD-10-CM

## 2022-10-19 DIAGNOSIS — M75.121 COMPLETE TEAR OF RIGHT ROTATOR CUFF, UNSPECIFIED WHETHER TRAUMATIC: ICD-10-CM

## 2022-10-19 PROCEDURE — 97140 MANUAL THERAPY 1/> REGIONS: CPT

## 2022-10-19 NOTE — PROGRESS NOTES
Daily Note     Today's date: 10/19/2022  Patient name: Alix Harris  : 1965  MRN: [id number]  Referring provider: Teena Tapia  Dx:   Encounter Diagnosis     ICD-10-CM    1  Right shoulder pain, unspecified chronicity  M25 511    2  Complete tear of right rotator cuff, unspecified whether traumatic  M75 121                   Subjective: Pt reports that her shoulder and elbow have been more bothersome lately and believes it is caused by weather related changes  Objective: See treatment diary below      Assessment: Tolerated treatment well  Patient demonstrated fatigue post treatment, exhibited good technique with therapeutic exercises and would benefit from continued PT  Pt is making steady progress towards her goals  Plan: Continue per plan of care        Precautions: s/p R shoulder RC repair      Manuals 9/2 9/8 9/9 9/12 9/14 9/15 10/3 10/4 10/10 10/19   PROM HK HK HK KO KO HK HK KO KO KO   [facility] jt mobs all planes HK HK HK NP   HK      Shoulder stretching routine HK HK HK NP KO HK HK KO KO KO                Ther Ex 9/2 9/8 9/9 9/12 9/14 9/15 10/3 10/4 10/10 10/19   Table slides  Flex and ER x30 x30 x30 x30 x30 x30 HEP HEP     Wall slides Sup  3x10 Stand  3x10 Stand  3x10 Stand  3x10 Stand  3x10 Stand  3x10 Stand  3x15 Stand  3x15 Stand Stand  3x15   Pulleys 6' 6' 6' 6' 6' 6' 6' 6' 6' 6'   UBE 3F/3B 3F/3B 3F/3B 3F/3B 3F/3B 3F/3B 3F/3B 3F/3B 3F/3B 3F/3B   Scap 4 Red  3x15 Green  3x10 Green  3x12 Green  3x15 Green  3x15 Green  3x15 Green  3x156 Green  3x15 Green  3x15 Blue  3x10   SL flexion  3x10 3x10 3x10 3x10 3x10 3x15 3x15 3x15 3x15   SL ER 3x15 3x15  1#  3x10 1#  3x12 1#  3x12 2#  3x10 2#  3x10 2#  3x10 2#  3x10   UE alpha 3x 3x  sup 3X  Sup  3x  sup 3x  sup 3x  sup 3x  2# 3x  2# 3x  2# 3x  2#   Prone pro/ret 2x30 2x30 2x30 2x30  2x30 2x30 2x30 2x30 2x30   BTH ER stretch     5"  2x10 5"  3x10 HEP HEP HEP    Belly Presses     5"  3x10 5"  3x10 5"  x30 5"  x30 5"  x30 5"  x30   Prone raises x 3       2x10 3x10 3x10 3x10                Modalities 9/2 9/8  9/12 9/14   10/4  10/19   MH 15'  post def  def    15'  15'

## 2022-10-20 ENCOUNTER — OFFICE VISIT (OUTPATIENT)
Dept: PHYSICAL THERAPY | Facility: MEDICAL CENTER | Age: 57
End: 2022-10-20

## 2022-10-20 DIAGNOSIS — M75.121 COMPLETE TEAR OF RIGHT ROTATOR CUFF, UNSPECIFIED WHETHER TRAUMATIC: ICD-10-CM

## 2022-10-20 DIAGNOSIS — M25.511 RIGHT SHOULDER PAIN, UNSPECIFIED CHRONICITY: Primary | ICD-10-CM

## 2022-10-20 PROCEDURE — 97140 MANUAL THERAPY 1/> REGIONS: CPT

## 2022-10-20 NOTE — PROGRESS NOTES
Daily Note     Today's date: 10/20/2022  Patient name: Joyce Pino  : 1965  MRN: [id number]  Referring provider: Garcia Flannery  Dx:   Encounter Diagnosis     ICD-10-CM    1  Right shoulder pain, unspecified chronicity  M25 511    2  Complete tear of right rotator cuff, unspecified whether traumatic  M75 121                   Subjective: Pt reports that her shoulder is feeling pretty good but still has some discomfort when reaching OH  Objective: See treatment diary below      Assessment: Tolerated treatment well  Patient demonstrated fatigue post treatment, exhibited good technique with therapeutic exercises and would benefit from continued PT  Pt is most restricted in the IR plane with PROM  Pt was challenged with performing alphabet ex's in standing position  Plan: Continue per plan of care        Precautions: s/p R shoulder RC repair      Manuals 10/20            PROM KO            GH jt mobs all planes             Shoulder stretching routine KO                         Ther Ex 10/20                         Wall slides Stand  3x15            Pulleys 6'            UBE 3F/3B            Scap 4 Blue  3x10            SL flexion 3x10            SL ER 2#  3x12                         Alphabet  Stand  2x                         Belly Presses 5"  x30            Prone raises x 3 3x10                         Modalities 10/20            MH 15'  post

## 2022-10-21 ENCOUNTER — APPOINTMENT (OUTPATIENT)
Dept: PHYSICAL THERAPY | Facility: MEDICAL CENTER | Age: 57
End: 2022-10-21

## 2022-10-24 ENCOUNTER — OFFICE VISIT (OUTPATIENT)
Dept: PHYSICAL THERAPY | Facility: MEDICAL CENTER | Age: 57
End: 2022-10-24

## 2022-10-24 DIAGNOSIS — M75.121 COMPLETE TEAR OF RIGHT ROTATOR CUFF, UNSPECIFIED WHETHER TRAUMATIC: ICD-10-CM

## 2022-10-24 DIAGNOSIS — M25.511 RIGHT SHOULDER PAIN, UNSPECIFIED CHRONICITY: Primary | ICD-10-CM

## 2022-10-24 PROCEDURE — 97140 MANUAL THERAPY 1/> REGIONS: CPT

## 2022-10-24 NOTE — PROGRESS NOTES
Daily Note     Today's date: 10/24/2022  Patient name: Mario Pinto  : 1965  MRN: [id number]  Referring provider: Nel White  Dx:   Encounter Diagnosis     ICD-10-CM    1  Right shoulder pain, unspecified chronicity  M25 511    2  Complete tear of right rotator cuff, unspecified whether traumatic  M75 121        Start Time: 1000          Subjective: Pt reports that she's getting more pain in her arm the past few days  Objective: See treatment diary below      Assessment: Tolerated treatment well  Patient demonstrated fatigue post treatment, exhibited good technique with therapeutic exercises and would benefit from continued PT   Pt struggles with flexion and IR both passively and actively  Plan: Continue per plan of care        Precautions: s/p R shoulder RC repair      Manuals 10/20 10/24           PROM GIL CORDERO           [facility] jt mobs all planes             Shoulder stretching routine KO KO                        Ther Ex 10/20 10/24                        Wall slides Stand  3x15 Stand  3x15           Pulleys 6' 6'           UBE 3F/3B 3F/3B           Scap 4 Blue  3x10 Blue  3x10           SL flexion 3x10 3x10           SL ER 2#  3x12 2#  3x12                        Alphabet  Stand  2x Stand  2x                        Belly Presses 5"  x30 5"  x30           Prone raises x 3 3x10 3x10                        Modalities 10/20 10/24           MH 15'  post Def

## 2022-10-26 ENCOUNTER — OFFICE VISIT (OUTPATIENT)
Dept: PHYSICAL THERAPY | Facility: MEDICAL CENTER | Age: 57
End: 2022-10-26

## 2022-10-26 DIAGNOSIS — M25.511 RIGHT SHOULDER PAIN, UNSPECIFIED CHRONICITY: Primary | ICD-10-CM

## 2022-10-26 DIAGNOSIS — M75.121 COMPLETE TEAR OF RIGHT ROTATOR CUFF, UNSPECIFIED WHETHER TRAUMATIC: ICD-10-CM

## 2022-10-26 PROCEDURE — 97140 MANUAL THERAPY 1/> REGIONS: CPT

## 2022-10-26 NOTE — PROGRESS NOTES
Daily Note     Today's date: 10/26/2022  Patient name: Librado Bobby  : 1965  MRN: [id number]  Referring provider: Rolo Hull  Dx:   Encounter Diagnosis     ICD-10-CM    1  Right shoulder pain, unspecified chronicity  M25 511    2  Complete tear of right rotator cuff, unspecified whether traumatic  M75 121                   Subjective: Pt reports that she's been trying to focus on her IR and ER mobility ex's/stretches at home  Objective: See treatment diary below      Assessment: Tolerated treatment well  Patient demonstrated fatigue post treatment, exhibited good technique with therapeutic exercises and would benefit from continued PT  Improved mobility after PROM today  Plan: Continue per plan of care        Precautions: s/p R shoulder RC repair      Manuals 10/20 10/24 10/26          PROM GIL CORDERO          [facility]             Shoulder stretching routine GIL CORDERO                       Ther Ex 10/20 10/24 10/26                       Wall slides Stand  3x15 Stand  3x15 Stand  3x15          Pulleys 6' 6' 6'          UBE 3F/3B 3F/3B 3F/3B          Scap 4 Blue  3x10 Blue  3x10 Blue  3x12          SL flexion 3x10 3x10 3x10          SL ER 2#  3x12 2#  3x12 2#  3x12                       Alphabet  Stand  2x Stand  2x Stand  2x                       Belly Presses 5"  x30 5"  x30 5"  x30          Prone raises x 3 3x10 3x10 3x10                       Modalities 10/20 10/24           MH 15'  post Def

## 2022-10-28 ENCOUNTER — OFFICE VISIT (OUTPATIENT)
Dept: PHYSICAL THERAPY | Facility: MEDICAL CENTER | Age: 57
End: 2022-10-28

## 2022-10-28 DIAGNOSIS — M75.121 COMPLETE TEAR OF RIGHT ROTATOR CUFF, UNSPECIFIED WHETHER TRAUMATIC: ICD-10-CM

## 2022-10-28 DIAGNOSIS — M25.511 RIGHT SHOULDER PAIN, UNSPECIFIED CHRONICITY: Primary | ICD-10-CM

## 2022-10-28 PROCEDURE — 97140 MANUAL THERAPY 1/> REGIONS: CPT

## 2022-10-28 NOTE — PROGRESS NOTES
Daily Note     Today's date: 10/28/2022  Patient name: Kevin Ann  : 1965  MRN: [id number]  Referring provider: Anna Meade  Dx:   Encounter Diagnosis     ICD-10-CM    1  Right shoulder pain, unspecified chronicity  M25 511    2  Complete tear of right rotator cuff, unspecified whether traumatic  M75 121                   Subjective: Pt reports that her shoulder has been more tight the past few days  Objective: See treatment diary below      Assessment: Tolerated treatment well  Patient demonstrated fatigue post treatment, exhibited good technique with therapeutic exercises and would benefit from continued PT  Pt displays improved mobility post manuals  Plan: Continue per plan of care        Precautions: s/p R shoulder RC repair      Manuals 10/20 10/24 10/26 10/28         PROM KO KO KO KO         [address] jt mobs all planes             Shoulder stretching routine KO KO KO KO                      Ther Ex 10/20 10/24 10/26 10/28                      Wall slides Stand  3x15 Stand  3x15 Stand  3x15 Stand  3x15         Pulleys 6' 6' 6' 6'         UBE 3F/3B 3F/3B 3F/3B 3F/3B         Scap 4 Blue  3x10 Blue  3x10 Blue  3x12 Blue  3x12         SL flexion 3x10 3x10 3x10 3x10         SL ER 2#  3x12 2#  3x12 2#  3x12 2#  3x12                      Alphabet  Stand  2x Stand  2x Stand  2x Stand  3x                      Belly Presses 5"  x30 5"  x30 5"  x30 5"  x30         Prone raises x 3 3x10 3x10 3x10 3x10                      Modalities 10/20 10/24           MH 15'  post Def

## 2022-10-31 ENCOUNTER — APPOINTMENT (OUTPATIENT)
Dept: PHYSICAL THERAPY | Facility: MEDICAL CENTER | Age: 57
End: 2022-10-31

## 2022-11-01 ENCOUNTER — OFFICE VISIT (OUTPATIENT)
Dept: PHYSICAL THERAPY | Facility: MEDICAL CENTER | Age: 57
End: 2022-11-01

## 2022-11-01 DIAGNOSIS — M75.121 COMPLETE TEAR OF RIGHT ROTATOR CUFF, UNSPECIFIED WHETHER TRAUMATIC: ICD-10-CM

## 2022-11-01 DIAGNOSIS — M25.511 RIGHT SHOULDER PAIN, UNSPECIFIED CHRONICITY: Primary | ICD-10-CM

## 2022-11-01 NOTE — PROGRESS NOTES
Daily Note     Today's date: 2022  Patient name: Leigh Henry  : 1965  MRN: [id number]  Referring provider: Guillermina Tipton  Dx:   Encounter Diagnosis     ICD-10-CM    1  Right shoulder pain, unspecified chronicity  M25 511    2  Complete tear of right rotator cuff, unspecified whether traumatic  M75 121                   Subjective: Pt reports that she still has difficulty reaching OH and knows that she compensates when reaching up  Objective: See treatment diary below      Assessment: Tolerated treatment well  Patient demonstrated fatigue post treatment, exhibited good technique with therapeutic exercises and would benefit from continued PT  Focused on decreasing PRC tightness and ways to avoid use of compensatory mm's when lifting OH  Plan: Continue per plan of care        Precautions: s/p R shoulder RC repair      Manuals 10/20 10/24 10/26 10/28 11/1        PROM KO KO KO KO KO        [facility] jt mobs all planes             Shoulder stretching routine KO KO KO KO KO        Prone PRC stretching     KO        Ther Ex 10/20 10/24 10/26 10/28 11/1                     Wall slides Stand  3x15 Stand  3x15 Stand  3x15 Stand  3x15 Stand  3x15        Pulleys 6' 6' 6' 6' 6'        UBE 3F/3B 3F/3B 3F/3B 3F/3B 3F/3B        Scap 4 Blue  3x10 Blue  3x10 Blue  3x12 Blue  3x12 Blue  3x15        SL flexion 3x10 3x10 3x10 3x10 3x10        SL ER 2#  3x12 2#  3x12 2#  3x12 2#  3x12 2#  3x12                     Alphabet  Stand  2x Stand  2x Stand  2x Stand  3x Stand  3x                     Belly Presses 5"  x30 5"  x30 5"  x30 5"  x30 5"  x30        Prone raises x 3 3x10 3x10 3x10 3x10 3x10                     Modalities 10/20 10/24           MH 15'  post Def

## 2022-11-02 ENCOUNTER — OFFICE VISIT (OUTPATIENT)
Dept: PHYSICAL THERAPY | Facility: MEDICAL CENTER | Age: 57
End: 2022-11-02

## 2022-11-02 DIAGNOSIS — M25.511 RIGHT SHOULDER PAIN, UNSPECIFIED CHRONICITY: Primary | ICD-10-CM

## 2022-11-02 DIAGNOSIS — M75.121 COMPLETE TEAR OF RIGHT ROTATOR CUFF, UNSPECIFIED WHETHER TRAUMATIC: ICD-10-CM

## 2022-11-02 NOTE — PROGRESS NOTES
Daily Note     Today's date: 2022  Patient name: Mario Pinto  : 1965  MRN: [id number]  Referring provider: Nel White  Dx:   Encounter Diagnosis     ICD-10-CM    1  Right shoulder pain, unspecified chronicity  M25 511    2  Complete tear of right rotator cuff, unspecified whether traumatic  M75 121                   Subjective: Pt reports that she is sore from yesterdays PT visit  Objective: See treatment diary below      Assessment: Tolerated treatment well  Patient demonstrated fatigue post treatment and would benefit from continued PT  Improved mobility after manuals but continues to compensate with alphabet and wall slide ex's  Plan: Continue per plan of care        Precautions: s/p R shoulder RC repair      Manuals 10/20 10/24 10/26 10/28 11/1 11/2       PROM KO KO KO KO KO KO       [facility] mobs all planes             Shoulder stretching routine KO KO KO KO KO KO       Prone PRC stretching     KO KO       Theragun to [facility]      KO                    Ther Ex 10/20 10/24 10/26 10/28 11/1 11/2                    Wall slides Stand  3x15 Stand  3x15 Stand  3x15 Stand  3x15 Stand  3x15 Stand  3x15       Pulleys 6' 6' 6' 6' 6' 6'       UBE 3F/3B 3F/3B 3F/3B 3F/3B 3F/3B 3F/3B       Scap 4 Blue  3x10 Blue  3x10 Blue  3x12 Blue  3x12 Blue  3x15 Blue  3x15       SL flexion 3x10 3x10 3x10 3x10 3x10 3x10       SL ER 2#  3x12 2#  3x12 2#  3x12 2#  3x12 2#  3x12 2#  3x12                    Alphabet  Stand  2x Stand  2x Stand  2x Stand  3x Stand  3x Stand  3x                    Belly Presses 5"  x30 5"  x30 5"  x30 5"  x30 5"  x30 5"  x30       Prone raises x 3 3x10 3x10 3x10 3x10 3x10 3x10                    Modalities 10/20 10/24           MH 15'  post Def

## 2022-11-04 ENCOUNTER — OFFICE VISIT (OUTPATIENT)
Dept: PHYSICAL THERAPY | Facility: MEDICAL CENTER | Age: 57
End: 2022-11-04

## 2022-11-04 DIAGNOSIS — M25.511 RIGHT SHOULDER PAIN, UNSPECIFIED CHRONICITY: Primary | ICD-10-CM

## 2022-11-04 DIAGNOSIS — M75.121 COMPLETE TEAR OF RIGHT ROTATOR CUFF, UNSPECIFIED WHETHER TRAUMATIC: ICD-10-CM

## 2022-11-04 NOTE — PROGRESS NOTES
Daily Note     Today's date: 2022  Patient name: Bj Cyr  : 1965  MRN: [id number]  Referring provider: Belle Thomas  Dx:   Encounter Diagnosis     ICD-10-CM    1  Right shoulder pain, unspecified chronicity  M25 511    2  Complete tear of right rotator cuff, unspecified whether traumatic  M75 121                   Subjective: Pt reports that she was quite sore after LV  Objective: See treatment diary below      Assessment: Tolerated treatment well  Patient demonstrated fatigue post treatment, exhibited good technique with therapeutic exercises and would benefit from continued PT  Pt only able to complete 1 alphabet today 2* fatigue  Plan: Continue per plan of care        Precautions: s/p R shoulder RC repair      Manuals 10/20 10/24 10/26 10/28 11/1 11/2 11/4      PROM KO KO KO KO KO KO KO      [address] [facility] [address] [facility] jt mobs all planes             Shoulder stretching routine KO KO KO KO KO KO KO      Prone PRC stretching     KO KO KO      Theragun to [facility]      KO KO                   Ther Ex 10/20 10/24 10/26 10/28 11/1 11/2 11/4                   Wall slides Stand  3x15 Stand  3x15 Stand  3x15 Stand  3x15 Stand  3x15 Stand  3x15 Stand  3x15      Pulleys 6' 6' 6' 6' 6' 6' 6'      UBE 3F/3B 3F/3B 3F/3B 3F/3B 3F/3B 3F/3B 3F/3B      Scap 4 Blue  3x10 Blue  3x10 Blue  3x12 Blue  3x12 Blue  3x15 Blue  3x15 Blue  3x15      SL flexion 3x10 3x10 3x10 3x10 3x10 3x10 3x10      SL ER 2#  3x12 2#  3x12 2#  3x12 2#  3x12 2#  3x12 2#  3x12 2#  3x12                   Alphabet  Stand  2x Stand  2x Stand  2x Stand  3x Stand  3x Stand  3x Stand  1x                   Belly Presses 5"  x30 5"  x30 5"  x30 5"  x30 5"  x30 5"  x30 5"  x30      Prone raises x 3 3x10 3x10 3x10 3x10 3x10 3x10 3x10      Prayer stretch       3x30"      Modalities 10/20 10/24           MH 15'  post Def

## 2022-11-07 ENCOUNTER — OFFICE VISIT (OUTPATIENT)
Dept: PHYSICAL THERAPY | Facility: MEDICAL CENTER | Age: 57
End: 2022-11-07

## 2022-11-07 DIAGNOSIS — M75.121 COMPLETE TEAR OF RIGHT ROTATOR CUFF, UNSPECIFIED WHETHER TRAUMATIC: ICD-10-CM

## 2022-11-07 DIAGNOSIS — M25.511 RIGHT SHOULDER PAIN, UNSPECIFIED CHRONICITY: Primary | ICD-10-CM

## 2022-11-07 NOTE — PROGRESS NOTES
Daily Note     Today's date: 2022  Patient name: Maegan Becerra  : 1965  MRN: [id number]  Referring provider: Kim Diaz  Dx:   Encounter Diagnosis     ICD-10-CM    1  Right shoulder pain, unspecified chronicity  M25 511    2  Complete tear of right rotator cuff, unspecified whether traumatic  M75 121                   Subjective: Pt reports continued progress  She states that the kneeling lat stretched has helped to loosen up her shoulder  Objective: See treatment diary below      Assessment: Tolerated treatment well  Patient demonstrated fatigue post treatment, exhibited good technique with therapeutic exercises and would benefit from continued PT      Plan: Continue per plan of care        Precautions: s/p R shoulder RC repair      Manuals 10/20 10/24 10/26 10/28 11/1 11/2 11/4 11/7     PROM KO KO KO KO KO KO KO HK     [facility] jt mobs all planes             Shoulder stretching routine KO KO KO KO KO KO KO HK     Prone [facility] stretching     KO KO KO HK     Theragun to [address] [facility] NP                  Ther Ex 10/20 10/24 10/26 10/28 11/1 11/2 11/4 11/7                  Wall slides Stand  3x15 Stand  3x15 Stand  3x15 Stand  3x15 Stand  3x15 Stand  3x15 Stand  3x15 Stand  3x15     Pulleys 6' 6' 6' 6' 6' 6' 6' 6'     UBE 3F/3B 3F/3B 3F/3B 3F/3B 3F/3B 3F/3B 3F/3B 3F/3B     Scap 4 Blue  3x10 Blue  3x10 Blue  3x12 Blue  3x12 Blue  3x15 Blue  3x15 Blue  3x15 Blue  3x15     SL flexion 3x10 3x10 3x10 3x10 3x10 3x10 3x10 3x10     SL ER 2#  3x12 2#  3x12 2#  3x12 2#  3x12 2#  3x12 2#  3x12 2#  3x12 2#  3x15                  Alphabet  Stand  2x Stand  2x Stand  2x Stand  3x Stand  3x Stand  3x Stand  1x Stand  3x                  Belly Presses 5"  x30 5"  x30 5"  x30 5"  x30 5"  x30 5"  x30 5"  x30 5"  x30     Prone raises x 3 3x10 3x10 3x10 3x10 3x10 3x10 3x10 3x10     Prayer stretch       3x30" 3x30"     Modalities 10/20 10/24           MH 15'  post Def

## 2022-11-09 ENCOUNTER — OFFICE VISIT (OUTPATIENT)
Dept: PHYSICAL THERAPY | Facility: MEDICAL CENTER | Age: 57
End: 2022-11-09

## 2022-11-09 DIAGNOSIS — M25.511 RIGHT SHOULDER PAIN, UNSPECIFIED CHRONICITY: Primary | ICD-10-CM

## 2022-11-09 DIAGNOSIS — M75.121 COMPLETE TEAR OF RIGHT ROTATOR CUFF, UNSPECIFIED WHETHER TRAUMATIC: ICD-10-CM

## 2022-11-09 NOTE — PROGRESS NOTES
Daily Note     Today's date: 2022  Patient name: Manny Ibarra  : 1965  MRN: [id number]  Referring provider: Lizy Crabtree  Dx:   Encounter Diagnosis     ICD-10-CM    1  Right shoulder pain, unspecified chronicity  M25 511    2  Complete tear of right rotator cuff, unspecified whether traumatic  M75 121                   Subjective: Pt reports that her shoulder is feeling better since she started the prayer stretch  Pt also states that she can reach further [facility] now  Objective: See treatment diary below      Assessment: Tolerated treatment well  Patient demonstrated fatigue post treatment, exhibited good technique with therapeutic exercises and would benefit from continued PT  Pt has made more progress in her mobility lately  Plan: Continue per plan of care        Precautions: s/p R shoulder RC repair      Manuals 10/20 10/24 10/26 10/28 11/1 11/2 11/4 11/7 11/9    PROM KO KO KO KO KO KO KO HK KO    [address] [facility] jt mobs all planes             Shoulder stretching routine KO KO KO KO KO KO KO HK KO    Prone [address] [facility] stretching     KO KO KO HK KO    Theragun to [address] [facility] NP NP                 Ther Ex 10/20 10/24 10/26 10/28 11/1 11/2 11/4 11/7 11/9                 Wall slides Stand  3x15 Stand  3x15 Stand  3x15 Stand  3x15 Stand  3x15 Stand  3x15 Stand  3x15 Stand  3x15 Stand  3x15    Pulleys 6' 6' 6' 6' 6' 6' 6' 6' 6'    UBE 3F/3B 3F/3B 3F/3B 3F/3B 3F/3B 3F/3B 3F/3B 3F/3B 3F/3B    Scap 4 Blue  3x10 Blue  3x10 Blue  3x12 Blue  3x12 Blue  3x15 Blue  3x15 Blue  3x15 Blue  3x15 Blue  3x15    SL flexion 3x10 3x10 3x10 3x10 3x10 3x10 3x10 3x10 3x10    SL ER 2#  3x12 2#  3x12 2#  3x12 2#  3x12 2#  3x12 2#  3x12 2#  3x12 2#  3x15 2#  3x15                 Alphabet  Stand  2x Stand  2x Stand  2x Stand  3x Stand  3x Stand  3x Stand  1x Stand  3x Stand  3x                 Belly Presses 5"  x30 5"  x30 5"  x30 5"  x30 5"  x30 5"  x30 5"  x30 5"  x30 5"  x30    Prone raises x 3 3x10 3x10 3x10 3x10 3x10 3x10 3x10 3x10 3x10    Prayer stretch       3x30" 3x30" 3x30"    Modalities 10/20 10/24           MH 15'  post Def

## 2022-11-11 ENCOUNTER — OFFICE VISIT (OUTPATIENT)
Dept: PHYSICAL THERAPY | Facility: MEDICAL CENTER | Age: 57
End: 2022-11-11

## 2022-11-11 DIAGNOSIS — M25.511 RIGHT SHOULDER PAIN, UNSPECIFIED CHRONICITY: ICD-10-CM

## 2022-11-11 DIAGNOSIS — M75.121 COMPLETE TEAR OF RIGHT ROTATOR CUFF, UNSPECIFIED WHETHER TRAUMATIC: Primary | ICD-10-CM

## 2022-11-11 NOTE — PROGRESS NOTES
Daily Note     Today's date: 2022  Patient name: Maya Sidhu  : 1965  MRN: [id number]  Referring provider: Audrey Alpers  Dx:   Encounter Diagnosis     ICD-10-CM    1  Complete tear of right rotator cuff, unspecified whether traumatic  M75 121    2  Right shoulder pain, unspecified chronicity  M25 511                   Subjective: Pt reports continued difficulty with ROM above shoulder height  Objective: See treatment diary below    Treatment today was focused on proper technique and slowing the pace to allow for maximum benefit of the exercises  Assessment: Tolerated treatment well  Patient demonstrated fatigue post treatment, exhibited good technique with therapeutic exercises and would benefit from continued PT      Plan: Continue per plan of care        Precautions: s/p R shoulder RC repair      Manuals 10/20 10/24 10/26 10/28 11/1 11/2 11/4 11/7 11/9 11/11   PROM KO KO KO KO KO KO KO HK KO HK   [facility] jt mobs all planes             Shoulder stretching routine KO KO KO KO KO KO KO HK KO HK   Prone PRC stretching     KO KO KO HK KO HK   Theragun to [address] [facility] NP NP                 Ther Ex 10/20 10/24 10/26 10/28 11/1 11/2 11/4 11/7 11/9 11/11                Wall slides Stand  3x15 Stand  3x15 Stand  3x15 Stand  3x15 Stand  3x15 Stand  3x15 Stand  3x15 Stand  3x15 Stand  3x15 Sup  3x15   Pulleys 6' 6' 6' 6' 6' 6' 6' 6' 6' 6'   UBE 3F/3B 3F/3B 3F/3B 3F/3B 3F/3B 3F/3B 3F/3B 3F/3B 3F/3B 3F/3B   Scap 4 Blue  3x10 Blue  3x10 Blue  3x12 Blue  3x12 Blue  3x15 Blue  3x15 Blue  3x15 Blue  3x15 Blue  3x15 Green  3x15   SL flexion 3x10 3x10 3x10 3x10 3x10 3x10 3x10 3x10 3x10 3x15   SL ER 2#  3x12 2#  3x12 2#  3x12 2#  3x12 2#  3x12 2#  3x12 2#  3x12 2#  3x15 2#  3x15 3x15  5"                Alphabet  Stand  2x Stand  2x Stand  2x Stand  3x Stand  3x Stand  3x Stand  1x Stand  3x Stand  3x Sup  3x                Belly Presses 5"  x30 5"  x30 5"  x30 5"  x30 5"  x30 5"  x30 5"  x30 5"  x30 5"  x30 5"  x30   Prone raises x 3 3x10 3x10 3x10 3x10 3x10 3x10 3x10 3x10 3x10 3x10   Prayer stretch       3x30" 3x30" 3x30" 3x30"   Modalities 10/20 10/24           MH 15'  post Def

## 2022-11-18 ENCOUNTER — APPOINTMENT (OUTPATIENT)
Dept: PHYSICAL THERAPY | Facility: MEDICAL CENTER | Age: 57
End: 2022-11-18

## 2022-11-21 ENCOUNTER — OFFICE VISIT (OUTPATIENT)
Dept: PHYSICAL THERAPY | Facility: MEDICAL CENTER | Age: 57
End: 2022-11-21

## 2022-11-21 DIAGNOSIS — M25.511 RIGHT SHOULDER PAIN, UNSPECIFIED CHRONICITY: ICD-10-CM

## 2022-11-21 DIAGNOSIS — M75.121 COMPLETE TEAR OF RIGHT ROTATOR CUFF, UNSPECIFIED WHETHER TRAUMATIC: Primary | ICD-10-CM

## 2022-11-21 NOTE — PROGRESS NOTES
Daily Note     Today's date: 2022  Patient name: Jay Mendieta  : 1965  MRN: [id number]  Referring provider: Humaira Parish  Dx:   Encounter Diagnosis     ICD-10-CM    1  Complete tear of right rotator cuff, unspecified whether traumatic  M75 121       2  Right shoulder pain, unspecified chronicity  M25 511                      Subjective:  Pt reports that her ROM is improved since last visit  Objective: See treatment diary below      Assessment: Tolerated treatment well  Patient demonstrated fatigue post treatment, exhibited good technique with therapeutic exercises and would benefit from continued PT  Pt with improved AROM since last visit  Plan: Continue per plan of care        Precautions: s/p R shoulder RC repair      Manuals             PROM HK            [facility] jt mobs all planes HK            Shoulder stretching routine HK            Prone PRC stretching HK            Theragun to [facility]                          Ther Ex             Wall slides Stand  3x15            Pulleys 6'            UBE 3F/3B            Scap 4 Green  3x15            SL flexion 1#  3x15            SL ER 5"  3x15                         Alphabet  1#  3x  sup                         Belly Presses 5"  x30            Prone raises x 3 3x15            Prayer stretch 3x30"

## 2022-11-23 ENCOUNTER — OFFICE VISIT (OUTPATIENT)
Dept: PHYSICAL THERAPY | Facility: MEDICAL CENTER | Age: 57
End: 2022-11-23

## 2022-11-23 DIAGNOSIS — M25.511 RIGHT SHOULDER PAIN, UNSPECIFIED CHRONICITY: Primary | ICD-10-CM

## 2022-11-23 DIAGNOSIS — M75.121 COMPLETE TEAR OF RIGHT ROTATOR CUFF, UNSPECIFIED WHETHER TRAUMATIC: ICD-10-CM

## 2022-11-23 NOTE — PROGRESS NOTES
Daily Note     Today's date: 2022  Patient name: Valeriano Kim  : 1965  MRN: [id number]  Referring provider: Wen Holloway  Dx:   Encounter Diagnosis     ICD-10-CM    1  Right shoulder pain, unspecified chronicity  M25 511       2  Complete tear of right rotator cuff, unspecified whether traumatic  M75 121                      Subjective: Pt reports that her shoulder is improved over the past week or so  Objective: See treatment diary below      Assessment: Tolerated treatment well  Patient demonstrated fatigue post treatment, exhibited good technique with therapeutic exercises and would benefit from continued PT      Plan: Continue per plan of care        Precautions: s/p R shoulder RC repair      Manuals            PROM HK HK           [facility] jt mobs all planes HK HK           Shoulder stretching routine HK HK           Prone PRC stretching HK HK           Theragun to [facility]                          Ther Ex            Wall slides Stand  3x15 Supine  3x15           Pulleys 6' 6'           UBE 3F/3B 3F/3B           Scap 4 Green  3x15 Green  3x15           SL flexion 1#  3x15 3x15  1#           SL ER 5"  3x15 5"  3x15                        Alphabet  1#  3x  sup 3x  Sup  1#                        Belly Presses 5"  x30 5"  x30           Prone raises x 3 3x15 3x15           Prayer stretch 3x30" 3x30"

## 2022-11-25 ENCOUNTER — APPOINTMENT (OUTPATIENT)
Dept: PHYSICAL THERAPY | Facility: MEDICAL CENTER | Age: 57
End: 2022-11-25

## 2022-11-30 ENCOUNTER — APPOINTMENT (OUTPATIENT)
Dept: PHYSICAL THERAPY | Facility: MEDICAL CENTER | Age: 57
End: 2022-11-30

## 2022-12-01 ENCOUNTER — OFFICE VISIT (OUTPATIENT)
Dept: PHYSICAL THERAPY | Facility: MEDICAL CENTER | Age: 57
End: 2022-12-01

## 2022-12-01 DIAGNOSIS — M25.511 RIGHT SHOULDER PAIN, UNSPECIFIED CHRONICITY: Primary | ICD-10-CM

## 2022-12-01 DIAGNOSIS — M75.121 COMPLETE TEAR OF RIGHT ROTATOR CUFF, UNSPECIFIED WHETHER TRAUMATIC: ICD-10-CM

## 2022-12-01 NOTE — PROGRESS NOTES
Daily Note     Today's date: 2022  Patient name: Cheryle Chase  : 1965  MRN: [id number]  Referring provider: Ramon Short  Dx:   Encounter Diagnosis     ICD-10-CM    1  Right shoulder pain, unspecified chronicity  M25 511       2  Complete tear of right rotator cuff, unspecified whether traumatic  M75 121                      Subjective: Pt reports that she sees improvement in her shoulder and has better mobility since she started the prayer stretch  Objective: See treatment diary below      Assessment: Tolerated treatment well  Patient demonstrated fatigue post treatment, exhibited good technique with therapeutic exercises and would benefit from continued PT   Pt's mobility has improved and is making steady progress  Plan: Continue per plan of care        Precautions: s/p R shoulder RC repair      Manuals           PROM HK HK KO          [address] [facility] jt mobs all planes HK HK           Shoulder stretching routine HK HK KO          Prone PRC stretching HK HK NP          Theragun to [facility]                          Ther Ex           Wall slides Stand  3x15 Supine  3x15 Sup  3x15          Pulleys 6' 6' 6'          UBE 3F/3B 3F/3B 3F/3B          Scap 4 Green  3x15 Green  3x15 Green  3x15          SL flexion 1#  3x15 3x15  1# 3x15  1#          SL ER 5"  3x15 5"  3x15 3x15  1#                       Alphabet  1#  3x  sup 3x  Sup  1# Sup  1#  3x                       Belly Presses 5"  x30 5"  x30 5"  x30          Prone raises x 3 3x15 3x15 3x15          Prayer stretch 3x30" 3x30" 3x30"

## 2022-12-02 ENCOUNTER — OFFICE VISIT (OUTPATIENT)
Dept: PHYSICAL THERAPY | Facility: MEDICAL CENTER | Age: 57
End: 2022-12-02

## 2022-12-02 DIAGNOSIS — M75.121 COMPLETE TEAR OF RIGHT ROTATOR CUFF, UNSPECIFIED WHETHER TRAUMATIC: ICD-10-CM

## 2022-12-02 DIAGNOSIS — M25.511 RIGHT SHOULDER PAIN, UNSPECIFIED CHRONICITY: Primary | ICD-10-CM

## 2022-12-02 NOTE — PROGRESS NOTES
Daily Note     Today's date: 2022  Patient name: Paulina Alvarez  : 1965  MRN: [id number]  Referring provider: Martin Brooks  Dx:   Encounter Diagnosis     ICD-10-CM    1  Right shoulder pain, unspecified chronicity  M25 511       2  Complete tear of right rotator cuff, unspecified whether traumatic  M75 121                      Subjective: Pt offers no new comments or complaints since yesterdays visit  Objective: See treatment diary below      Assessment: Tolerated treatment well  Patient demonstrated fatigue post treatment, exhibited good technique with therapeutic exercises and would benefit from continued PT      Plan: Continue per plan of care        Precautions: s/p R shoulder RC repair      Manuals          PROM HK HK KO KO         [facility] jt mobs all planes HK HK           Shoulder stretching routine HK HK KO KO         Prone PRC stretching HK HK NP KO         Theragun to [facility]                          Ther Ex          Wall slides Stand  3x15 Supine  3x15 Sup  3x15 Sup  3x15         Pulleys 6' 6' 6' 6'         UBE 3F/3B 3F/3B 3F/3B 3F/3B         Scap 4 Green  3x15 Green  3x15 Green  3x15 Green  3x15         SL flexion 1#  3x15 3x15  1# 3x15  1# 3x15  1#           SL ER 5"  3x15 5"  3x15 3x15  1# 3x15  1#                      Alphabet  1#  3x  sup 3x  Sup  1# Sup  1#  3x Sup  1#  3x                      Belly Presses 5"  x30 5"  x30 5"  x30 5"  x30         Prone raises x 3 3x15 3x15 3x15 3x15         Prayer stretch 3x30" 3x30" 3x30" 3x30"

## 2022-12-05 ENCOUNTER — APPOINTMENT (OUTPATIENT)
Dept: PHYSICAL THERAPY | Facility: MEDICAL CENTER | Age: 57
End: 2022-12-05

## 2022-12-06 ENCOUNTER — OFFICE VISIT (OUTPATIENT)
Dept: PHYSICAL THERAPY | Facility: MEDICAL CENTER | Age: 57
End: 2022-12-06

## 2022-12-06 DIAGNOSIS — M25.511 RIGHT SHOULDER PAIN, UNSPECIFIED CHRONICITY: Primary | ICD-10-CM

## 2022-12-06 DIAGNOSIS — M75.121 COMPLETE TEAR OF RIGHT ROTATOR CUFF, UNSPECIFIED WHETHER TRAUMATIC: ICD-10-CM

## 2022-12-06 NOTE — PROGRESS NOTES
Daily Note     Today's date: 2022  Patient name: Roberta Cardenas  : 1965  MRN: [id number]  Referring provider: Gaudencio Castillo  Dx:   Encounter Diagnosis     ICD-10-CM    1  Right shoulder pain, unspecified chronicity  M25 511       2  Complete tear of right rotator cuff, unspecified whether traumatic  M75 121                      Subjective: Pt reports that her shoulder is feeling good  Objective: See treatment diary below      Assessment: Tolerated treatment well  Patient demonstrated fatigue post treatment, exhibited good technique with therapeutic exercises and would benefit from continued PT  Pt's mobility is making good progress  Plan: Continue per plan of care        Precautions: s/p R shoulder RC repair      Manuals         PROM HK HK KO KO KO        [facility] jt mobs all planes HK HK           Shoulder stretching routine HK HK KO KO KO        Prone PRC stretching HK HK NP KO KO        Theragun to [facility]                          Ther Ex         Wall slides Stand  3x15 Supine  3x15 Sup  3x15 Sup  3x15 Sup  1#  3x10        Pulleys 6' 6' 6' 6' 6'        UBE 3F/3B 3F/3B 3F/3B 3F/3B 3F/3B        Scap 4 Green  3x15 Green  3x15 Green  3x15 Green  3x15 Green  3x15        SL flexion 1#  3x15 3x15  1# 3x15  1# 3x15  1#   3x15  1#        SL ER 5"  3x15 5"  3x15 3x15  1# 3x15  1# 3x15  1#                     Alphabet  1#  3x  sup 3x  Sup  1# Sup  1#  3x Sup  1#  3x Bautista[  1#  3x                     Belly Presses 5"  x30 5"  x30 5"  x30 5"  x30 5"  x30        Prone raises x 3 3x15 3x15 3x15 3x15 3x15        Prayer stretch 3x30" 3x30" 3x30" 3x30" 3x30"

## 2022-12-08 ENCOUNTER — OFFICE VISIT (OUTPATIENT)
Dept: PHYSICAL THERAPY | Facility: MEDICAL CENTER | Age: 57
End: 2022-12-08

## 2022-12-08 DIAGNOSIS — M25.511 RIGHT SHOULDER PAIN, UNSPECIFIED CHRONICITY: Primary | ICD-10-CM

## 2022-12-08 DIAGNOSIS — M75.121 COMPLETE TEAR OF RIGHT ROTATOR CUFF, UNSPECIFIED WHETHER TRAUMATIC: ICD-10-CM

## 2022-12-08 NOTE — PROGRESS NOTES
Daily Note     Today's date: 2022  Patient name: Roberta Cardenas  : 1965  MRN: [id number]  Referring provider: Gaudencio Castillo  Dx:   Encounter Diagnosis     ICD-10-CM    1  Right shoulder pain, unspecified chronicity  M25 511       2  Complete tear of right rotator cuff, unspecified whether traumatic  M75 121                      Subjective: Pt reports that her shoulder is feeling good and is seeing steady improvement  Objective: See treatment diary below      Assessment: Tolerated treatment well  Patient demonstrated fatigue post treatment, exhibited good technique with therapeutic exercises and would benefit from continued PT      Plan: Continue per plan of care        Precautions: s/p R shoulder RC repair      Manuals        PROM HK [address]       [address] [facility] [address] jt mobs all planes HK HK           Shoulder stretching routine HK HK KO KO KO KO       Prone PRC stretching HK HK NP KO KO KO       Theragun to [facility]                          Ther Ex        Wall slides Stand  3x15 Supine  3x15 Sup  3x15 Sup  3x15 Sup  1#  3x10 Sup  1#  3x10       Pulleys 6' 6' 6' 6' 6' 6'       UBE 3F/3B 3F/3B 3F/3B 3F/3B 3F/3B 3F/3B       Scap 4 Green  3x15 Green  3x15 Green  3x15 Green  3x15 Green  3x15 Green  3x15       SL flexion 1#  3x15 3x15  1# 3x15  1# 3x15  1#   3x15  1# 3x15  1#       SL ER 5"  3x15 5"  3x15 3x15  1# 3x15  1# 3x15  1# 3x15  1#                    Alphabet  1#  3x  sup 3x  Sup  1# Sup  1#  3x Sup  1#  3x Bautista[  1#  3x Sup  1#  3x                    Belly Presses 5"  x30 5"  x30 5"  x30 5"  x30 5"  x30 5"  x30       Prone raises x 3 3x15 3x15 3x15 3x15 3x15 3x15       Prayer stretch 3x30" 3x30" 3x30" 3x30" 3x30" 3x30"

## 2022-12-13 ENCOUNTER — OFFICE VISIT (OUTPATIENT)
Dept: PHYSICAL THERAPY | Facility: MEDICAL CENTER | Age: 57
End: 2022-12-13

## 2022-12-13 DIAGNOSIS — M25.511 RIGHT SHOULDER PAIN, UNSPECIFIED CHRONICITY: Primary | ICD-10-CM

## 2022-12-13 DIAGNOSIS — M75.121 COMPLETE TEAR OF RIGHT ROTATOR CUFF, UNSPECIFIED WHETHER TRAUMATIC: ICD-10-CM

## 2022-12-13 NOTE — PROGRESS NOTES
Daily Note     Today's date: 2022  Patient name: Doroteo Mckay  : 1965  MRN: [id number]  Referring provider: Jose L Diez  Dx:   Encounter Diagnosis     ICD-10-CM    1  Right shoulder pain, unspecified chronicity  M25 511       2  Complete tear of right rotator cuff, unspecified whether traumatic  M75 121                      Subjective: Pt reports that her shoulder is feeling pretty good  Objective: See treatment diary below      Assessment: Tolerated treatment well  Patient demonstrated fatigue post treatment, exhibited good technique with therapeutic exercises and would benefit from continued PT      Plan: Continue per plan of care        Precautions: s/p R shoulder RC repair      Manuals       PROM HK [facility]      [address] jt mobs all planes HK HK           Shoulder stretching routine HK HK KO KO KO KO KO      Prone [address] [place] [address] [address]                          Ther Ex       Wall slides Stand  3x15 Supine  3x15 Sup  3x15 Sup  3x15 Sup  1#  3x10 Sup  1#  3x10 Sup  1#  3x15      Pulleys 6' 6' 6' 6' 6' 6' 6'      UBE 3F/3B 3F/3B 3F/3B 3F/3B 3F/3B 3F/3B 3F/3B      Scap 4 Green  3x15 Green  3x15 Green  3x15 Green  3x15 Green  3x15 Green  3x15 Blue  3x10      SL flexion 1#  3x15 3x15  1# 3x15  1# 3x15  1#   3x15  1# 3x15  1# 3x15  1#      SL ER 5"  3x15 5"  3x15 3x15  1# 3x15  1# 3x15  1# 3x15  1# 3x15  1#                   Alphabet  1#  3x  sup 3x  Sup  1# Sup  1#  3x Sup  1#  3x Bautista[  1#  3x Sup  1#  3x Sup  1#  3x                   Belly Presses 5"  x30 5"  x30 5"  x30 5"  x30 5"  x30 5"  x30 5"  x30      Prone raises x 3 3x15 3x15 3x15 3x15 3x15 3x15 3x15      Prayer stretch 3x30" 3x30" 3x30" 3x30" 3x30" 3x30" 3x30"

## 2022-12-15 ENCOUNTER — APPOINTMENT (OUTPATIENT)
Dept: PHYSICAL THERAPY | Facility: MEDICAL CENTER | Age: 57
End: 2022-12-15

## 2022-12-16 ENCOUNTER — OFFICE VISIT (OUTPATIENT)
Dept: PHYSICAL THERAPY | Facility: MEDICAL CENTER | Age: 57
End: 2022-12-16

## 2022-12-16 DIAGNOSIS — M75.121 COMPLETE TEAR OF RIGHT ROTATOR CUFF, UNSPECIFIED WHETHER TRAUMATIC: ICD-10-CM

## 2022-12-16 DIAGNOSIS — M25.511 RIGHT SHOULDER PAIN, UNSPECIFIED CHRONICITY: Primary | ICD-10-CM

## 2022-12-16 NOTE — PROGRESS NOTES
Daily Note     Today's date: 2022  Patient name: Luan Aleman  : 1965  MRN: [id number]  Referring provider: Diane Cuellar  Dx:   Encounter Diagnosis     ICD-10-CM    1  Right shoulder pain, unspecified chronicity  M25 511       2  Complete tear of right rotator cuff, unspecified whether traumatic  M75 121                      Subjective: Pt reports that her shoulder is feeling pretty good  Objective: See treatment diary below      Assessment: Tolerated treatment well  Patient demonstrated fatigue post treatment, exhibited good technique with therapeutic exercises and would benefit from continued PT  Pt demonstrates decreased UT compensation when reaching [place] now and is making good progress in her mobility and strength  Pt will be away for a month and will resume PT for a few visits upon return  Plan: Continue per plan of care        Precautions: s/p R shoulder RC repair      Manuals      PROM HK  [address] mobs all planes HK HK           Shoulder stretching routine HK HK KO KO KO KO KO KO     Prone PRC stretching HK HK NP KO KO KO KO NP     Theragun to [facility]                          Ther Ex      Wall slides Stand  3x15 Supine  3x15 Sup  3x15 Sup  3x15 Sup  1#  3x10 Sup  1#  3x10 Sup  1#  3x15 Sup  1#  3x15     Pulleys 6' 6' 6' 6' 6' 6' 6' 6'     UBE 3F/3B 3F/3B 3F/3B 3F/3B 3F/3B 3F/3B 3F/3B 3F/3B     Scap 4 Green  3x15 Green  3x15 Green  3x15 Green  3x15 Green  3x15 Green  3x15 Blue  3x10 Blue  3x10     SL flexion 1#  3x15 3x15  1# 3x15  1# 3x15  1#   3x15  1# 3x15  1# 3x15  1# 3x15  1#     SL ER 5"  3x15 5"  3x15 3x15  1# 3x15  1# 3x15  1# 3x15  1# 3x15  1# 3x15  1#                  Alphabet  1#  3x  sup 3x  Sup  1# Sup  1#  3x Sup  1#  3x Bautista[  1#  3x Sup  1#  3x Sup  1#  3x Sup  1#  3x                  Belly Presses 5"  x30 5"  x30 5"  x30 5"  x30 5"  x30 5"  x30 5"  x30 5"  x30     Prone raises x 3 3x15 3x15 3x15 3x15 3x15 3x15 3x15 3x15     Prayer stretch 3x30" 3x30" 3x30" 3x30" 3x30" 3x30" 3x30" 3x30"

## 2023-01-20 ENCOUNTER — OFFICE VISIT (OUTPATIENT)
Dept: PHYSICAL THERAPY | Facility: MEDICAL CENTER | Age: 58
End: 2023-01-20

## 2023-01-20 DIAGNOSIS — Z98.890 S/P RIGHT ROTATOR CUFF REPAIR: Primary | ICD-10-CM

## 2023-01-20 DIAGNOSIS — M54.12 CERVICAL RADICULOPATHY: ICD-10-CM

## 2023-01-20 NOTE — PROGRESS NOTES
Daily Note     Today's date: 2023  Patient name: Rosi Schlatter  : 1965  MRN: [id number]  Referring provider: Ladonna Godoy  Dx:   Encounter Diagnosis     ICD-10-CM    1  S/P right rotator cuff repair  Z98 890       2  Cervical radiculopathy  M54 12                      Subjective: Pt reports that her shoulder is feeling pretty good, just slightly tight in the front of her shoulder  Objective: See treatment diary below      Assessment: Tolerated treatment well  Patient demonstrated fatigue post treatment, exhibited good technique with therapeutic exercises and would benefit from continued PT      Plan: Continue per plan of care        Precautions: s/p R shoulder RC repair      Manuals     PROM HK HK [address] j[address] mobs all planes HK HK           Shoulder stretching routine HK HK KO KO KO KO KO KO KO    Prone PRC stretching HK HK NP KO KO KO KO NP     Theragun to [facility]                          Ther Ex     Wall slides Stand  3x15 Supine  3x15 Sup  3x15 Sup  3x15 Sup  1#  3x10 Sup  1#  3x10 Sup  1#  3x15 Sup  1#  3x15 Sup  1#  3x15    Pulleys 6' 6' 6' 6' 6' 6' 6' 6' 6'    UBE 3F/3B 3F/3B 3F/3B 3F/3B 3F/3B 3F/3B 3F/3B 3F/3B 3F/3B    Scap 4 Green  3x15 Green  3x15 Green  3x15 Green  3x15 Green  3x15 Green  3x15 Blue  3x10 Blue  3x10 Home    SL flexion 1#  3x15 3x15  1# 3x15  1# 3x15  1#   3x15  1# 3x15  1# 3x15  1# 3x15  1# 3x15  1#    SL ER 5"  3x15 5"  3x15 3x15  1# 3x15  1# 3x15  1# 3x15  1# 3x15  1# 3x15  1# 3x15  1#                 Alphabet  1#  3x  sup 3x  Sup  1# Sup  1#  3x Sup  1#  3x Bautista[  1#  3x Sup  1#  3x Sup  1#  3x Sup  1#  3x Sup  1#  3x                 Belly Presses 5"  x30 5"  x30 5"  x30 5"  x30 5"  x30 5"  x30 5"  x30 5"  x30 5"  x30    Prone raises x 3 3x15 3x15 3x15 3x15 3x15 3x15 3x15 3x15 3x15    Prayer stretch 3x30" 3x30" 3x30" 3x30" 3x30" 3x30" 3x30" 3x30" 3x30"

## 2023-01-23 ENCOUNTER — APPOINTMENT (OUTPATIENT)
Dept: PHYSICAL THERAPY | Facility: MEDICAL CENTER | Age: 58
End: 2023-01-23

## 2023-01-24 ENCOUNTER — OFFICE VISIT (OUTPATIENT)
Dept: PHYSICAL THERAPY | Facility: MEDICAL CENTER | Age: 58
End: 2023-01-24

## 2023-01-24 DIAGNOSIS — Z98.890 S/P RIGHT ROTATOR CUFF REPAIR: ICD-10-CM

## 2023-01-24 DIAGNOSIS — M54.12 CERVICAL RADICULOPATHY: Primary | ICD-10-CM

## 2023-01-24 NOTE — PROGRESS NOTES
Daily Note     Today's date: 2023  Patient name: Michaela Boyd  : 1965  MRN: [id number]  Referring provider: Burton Bowers  Dx:   Encounter Diagnosis     ICD-10-CM    1  Cervical radiculopathy  M54 12       2  S/P right rotator cuff repair  Z98 890                      Subjective: Pt reports that she was in [employer] class this morning and her shoulder is sore from all the fast movements  Objective: See treatment diary below      Assessment: Tolerated treatment well  Patient demonstrated fatigue post treatment, exhibited good technique with therapeutic exercises and would benefit from continued PT  Improved mobility since LV 4 days ago  Pt will be traveling back to [facility] and will resume PT when she returns to the area next month  Plan: Continue per plan of care        Precautions: s/p R shoulder RC repair      Manuals    PROM HK  [address] [facility] jt mobs all planes HK HK           Shoulder stretching routine HK HK KO KO KO KO KO KO KO KO   Prone PRC stretching HK HK NP KO KO KO KO NP     Theragun to [facility]                          Ther Ex    Wall slides Stand  3x15 Supine  3x15 Sup  3x15 Sup  3x15 Sup  1#  3x10 Sup  1#  3x10 Sup  1#  3x15 Sup  1#  3x15 Sup  1#  3x15 Sup  1#  3x15   Pulleys 6' 6' 6' 6' 6' 6' 6' 6' 6' 6'   UBE 3F/3B 3F/3B 3F/3B 3F/3B 3F/3B 3F/3B 3F/3B 3F/3B 3F/3B 3F/3B   Scap 4 Green  3x15 Green  3x15 Green  3x15 Green  3x15 Green  3x15 Green  3x15 Blue  3x10 Blue  3x10 Home Blue  3x15   SL flexion 1#  3x15 3x15  1# 3x15  1# 3x15  1#   3x15  1# 3x15  1# 3x15  1# 3x15  1# 3x15  1# 3x15  1#   SL ER 5"  3x15 5"  3x15 3x15  1# 3x15  1# 3x15  1# 3x15  1# 3x15  1# 3x15  1# 3x15  1# 3x15  1#                Alphabet  1#  3x  sup 3x  Sup  1# Sup  1#  3x Sup  1#  3x Bautista[  1#  3x Sup  1#  3x Sup  1#  3x Sup  1#  3x Sup  1#  3x Sup  1#  3x                Belly Presses 5"  x30 5"  x30 5"  x30 5"  x30 5"  x30 5"  x30 5"  x30 5"  x30 5"  x30 5"  x30   Prone raises x 3 3x15 3x15 3x15 3x15 3x15 3x15 3x15 3x15 3x15 3x15   Prayer stretch 3x30" 3x30" 3x30" 3x30" 3x30" 3x30" 3x30" 3x30" 3x30" 3x30"

## 2023-01-26 ENCOUNTER — HOSPITAL ENCOUNTER (OUTPATIENT)
Dept: MAMMOGRAPHY | Facility: MEDICAL CENTER | Age: 58
Discharge: HOME | End: 2023-01-26

## 2023-01-26 VITALS — WEIGHT: 167.99 LBS | BODY MASS INDEX: 31.72 KG/M2 | HEIGHT: 61 IN

## 2023-01-26 DIAGNOSIS — Z12.31 ENCOUNTER FOR SCREENING MAMMOGRAM FOR MALIGNANT NEOPLASM OF BREAST: ICD-10-CM

## 2023-07-28 ENCOUNTER — APPOINTMENT (OUTPATIENT)
Dept: RADIOLOGY | Facility: MEDICAL CENTER | Age: 58
End: 2023-07-28
Payer: COMMERCIAL

## 2023-07-28 DIAGNOSIS — R22.1 NECK MASS: ICD-10-CM

## 2023-07-28 PROCEDURE — 71046 X-RAY EXAM CHEST 2 VIEWS: CPT

## 2023-07-31 ENCOUNTER — HOSPITAL ENCOUNTER (OUTPATIENT)
Dept: ULTRASOUND IMAGING | Facility: HOSPITAL | Age: 58
Discharge: HOME | End: 2023-07-31
Payer: COMMERCIAL

## 2023-07-31 DIAGNOSIS — R22.1 LOCALIZED SWELLING, MASS AND LUMP, NECK: ICD-10-CM

## 2023-07-31 PROCEDURE — 76536 US EXAM OF HEAD AND NECK: CPT

## 2023-10-24 NOTE — PROGRESS NOTES
Daily Note     Today's date: 2022  Patient name: Joshua Ray  : 1965  MRN: [id number]  Referring provider: Eli Blanchard  Dx:   Encounter Diagnosis     ICD-10-CM    1  Right shoulder pain, unspecified chronicity  M25 511    2  Complete tear of right rotator cuff, unspecified whether traumatic  M75 121                   Subjective: Pt reports that she experienced some increased swelling and soreness under her arm after LV  Objective: See treatment diary below      Assessment: Tolerated treatment fairly well    Patient was quite guarded today with flexion during PROM  Plan: Continue per plan of care        Precautions: s/p R shoulder RC repair      Manuals 6/24 6/28 7/1 7/5 7/8 7/11 7/15 7/18     PROM [place] [address] [facility] KO                                            Ther Ex          Post op HEP 2x10 ea                                                                                                       Modalities 6/24 6/28 7/1 7/5 7/8 7/11 7/15 7/18      PRN 15' 15' 15' 15' 15' 15' 15' [facility]    Phone Message    May a detailed message be left on voicemail: yes     Reason for Call: Appointment Intake    Referring Provider Name: Dr. Dang  Diagnosis and/or Symptoms: Pt is scheduled for [facility] on 12/27/23. He would like a call back to discuss a sooner visit if available.     Action Taken: Message routed to:  [facility] ([facility] Derm Surg    Travel Screening: Not Applicable

## 2024-03-19 ENCOUNTER — HOSPITAL ENCOUNTER (OUTPATIENT)
Dept: MAMMOGRAPHY | Facility: MEDICAL CENTER | Age: 59
Discharge: HOME | End: 2024-03-19
Payer: COMMERCIAL

## 2024-03-19 VITALS — HEIGHT: 61 IN | BODY MASS INDEX: 32.85 KG/M2 | WEIGHT: 174 LBS

## 2024-03-19 DIAGNOSIS — Z12.31 ENCOUNTER FOR SCREENING MAMMOGRAM FOR MALIGNANT NEOPLASM OF BREAST: ICD-10-CM

## 2024-03-19 PROCEDURE — 77067 SCR MAMMO BI INCL CAD: CPT

## 2024-03-19 PROCEDURE — 77063 BREAST TOMOSYNTHESIS BI: CPT

## 2025-08-21 ENCOUNTER — ANNUAL EXAM (OUTPATIENT)
Dept: GYNECOLOGY | Facility: CLINIC | Age: 60
End: 2025-08-21
Payer: COMMERCIAL

## 2025-08-21 VITALS
SYSTOLIC BLOOD PRESSURE: 140 MMHG | HEIGHT: 61 IN | OXYGEN SATURATION: 99 % | BODY MASS INDEX: 32.28 KG/M2 | WEIGHT: 171 LBS | DIASTOLIC BLOOD PRESSURE: 72 MMHG | HEART RATE: 57 BPM

## 2025-08-21 DIAGNOSIS — Z01.419 ENCOUNTER FOR GYNECOLOGICAL EXAMINATION WITHOUT ABNORMAL FINDING: ICD-10-CM

## 2025-08-21 DIAGNOSIS — Z12.11 SCREEN FOR COLON CANCER: ICD-10-CM

## 2025-08-21 DIAGNOSIS — N95.2 ATROPHIC VAGINITIS: ICD-10-CM

## 2025-08-21 DIAGNOSIS — Z12.31 ENCOUNTER FOR SCREENING MAMMOGRAM FOR MALIGNANT NEOPLASM OF BREAST: Primary | ICD-10-CM

## 2025-08-21 PROCEDURE — S0610 ANNUAL GYNECOLOGICAL EXAMINA: HCPCS | Performed by: OBSTETRICS & GYNECOLOGY

## 2025-08-21 RX ORDER — PHENOL 1.4 %
600 AEROSOL, SPRAY (ML) MUCOUS MEMBRANE 2 TIMES DAILY WITH MEALS
COMMUNITY

## (undated) DEVICE — GLOVE PI ULTRA TOUCH SZ 6

## (undated) DEVICE — KIT INCLUDES: GTB17, ALEXIS CONTAINED EXT SYS 5BX CNGL3, GELPOINT MINI ADV ACCESS PLATFORM: Brand: ALEXIS CONTAINED EXTRACTION SYSTEM WITH GELPOINT MINI ADVANCED ACCESS PLATFORM

## (undated) DEVICE — BETHLEHEM UNIVERSAL GYN LAP PK: Brand: CARDINAL HEALTH

## (undated) DEVICE — ENDOPATH XCEL BLADELESS TROCARS WITH STABILITY SLEEVES: Brand: ENDOPATH XCEL

## (undated) DEVICE — SUT VICRYL 0 CT-1 36 IN J946H

## (undated) DEVICE — ENDOPATH PNEUMONEEDLE INSUFFLATION NEEDLES WITH LUER LOCK CONNECTORS 120MM: Brand: ENDOPATH

## (undated) DEVICE — INTENDED FOR TISSUE SEPARATION, AND OTHER PROCEDURES THAT REQUIRE A SHARP SURGICAL BLADE TO PUNCTURE OR CUT.: Brand: BARD-PARKER SAFETY BLADES SIZE 11, STERILE

## (undated) DEVICE — SUT PLAIN 3-0 PS-1 27 IN 1640H

## (undated) DEVICE — TRAY FOLEY 16FR URIMETER SILICONE SURESTEP

## (undated) DEVICE — SUT SILK 0 CT-1 30 IN 424H

## (undated) DEVICE — FLEXIBLE ADHESIVE BANDAGE,X-LARGE: Brand: CURITY

## (undated) DEVICE — LAPAROSCOPIC SMOKE EVAC TUBING

## (undated) DEVICE — IRRIG ENDO FLO TUBING

## (undated) DEVICE — SYRINGE 50ML LL

## (undated) DEVICE — PLASTIC ADHESIVE BANDAGE: Brand: CURITY

## (undated) DEVICE — GLOVE INDICATOR UNDERGLOVE SZ 6 BLUE

## (undated) DEVICE — DRAPE SURGIKIT SADDLE BAG LAP

## (undated) DEVICE — BLUE HEAT SCOPE WARMER

## (undated) DEVICE — 3M™ TEGADERM™ TRANSPARENT FILM DRESSING FRAME STYLE, 1626W, 4 IN X 4-3/4 IN (10 CM X 12 CM), 50/CT 4CT/CASE: Brand: 3M™ TEGADERM™

## (undated) DEVICE — UTERINE MANIPULATOR RUMI 6.7 X 8 CM

## (undated) DEVICE — ASTOUND STANDARD SURGICAL GOWN, XL: Brand: CONVERTORS

## (undated) DEVICE — MAYO STAND COVER: Brand: CONVERTORS

## (undated) DEVICE — ENDOPATH XCEL UNIVERSAL TROCAR STABLILITY SLEEVES: Brand: ENDOPATH XCEL

## (undated) DEVICE — GLOVE INDICATOR PI UNDERGLOVE SZ 7 BLUE

## (undated) DEVICE — SUT VICRYL 0 UR-6 27 IN J603H

## (undated) DEVICE — ASTOUND STANDARD SURGICAL GOWN, XXL: Brand: CONVERTORS

## (undated) DEVICE — PREMIUM DRY TRAY LF: Brand: MEDLINE INDUSTRIES, INC.

## (undated) DEVICE — TUBING SUCTION 5MM X 12 FT

## (undated) DEVICE — 3000CC GUARDIAN II: Brand: GUARDIAN

## (undated) DEVICE — OCCLUDER COLPO-PNEUMO

## (undated) DEVICE — LIGASURE LAP SLR/DIV MARYLAND 5MM

## (undated) DEVICE — POOLE SUCTION HANDLE: Brand: CARDINAL HEALTH

## (undated) DEVICE — [HIGH FLOW INSUFFLATOR,  DO NOT USE IF PACKAGE IS DAMAGED,  KEEP DRY,  KEEP AWAY FROM SUNLIGHT,  PROTECT FROM HEAT AND RADIOACTIVE SOURCES.]: Brand: PNEUMOSURE

## (undated) DEVICE — PENCIL ELECTROSURG E-Z CLEAN -0035H

## (undated) DEVICE — UNDYED BRAIDED (POLYGLACTIN 910), SYNTHETIC ABSORBABLE SUTURE: Brand: COATED VICRYL

## (undated) DEVICE — ADHESIVE SKN CLSR HISTOACRYL FLEX 0.5ML LF

## (undated) DEVICE — KIT INCLUDES:GTB14, ALEXIS CONTAINED EXT SYS 5BXCNGL3, GELPOINT MINI ADV ACCESS PLATFORM: Brand: ALEXIS CONTAINED EXTRACTION SYSTEM WITH GELPOINT MINI ADVANCED ACCESS PLATFORM

## (undated) DEVICE — SCD SEQUENTIAL COMPRESSION COMFORT SLEEVE MEDIUM KNEE LENGTH: Brand: KENDALL SCD

## (undated) DEVICE — ELECTRODE LAP J HOOK E-Z CLEAN 33CM-0021

## (undated) DEVICE — GLOVE INDICATOR PI UNDERGLOVE SZ 8 BLUE

## (undated) DEVICE — GLOVE PI ULTRA TOUCH SZ.7.0

## (undated) DEVICE — SUT STRATAFIX SPIRAL 2-0 CT-1 30 CM SXPP1B410

## (undated) DEVICE — Device: Brand: MEDEX

## (undated) DEVICE — GLOVE INDICATOR PI UNDERGLOVE SZ 6.5 BLUE

## (undated) DEVICE — GLOVE PI ULTRA TOUCH SZ.7.5

## (undated) DEVICE — SUT MONOCRYL 3-0 PS-2 18 IN Y497G

## (undated) DEVICE — CHLORAPREP HI-LITE 26ML ORANGE